# Patient Record
Sex: MALE | Race: WHITE | Employment: PART TIME | URBAN - METROPOLITAN AREA
[De-identification: names, ages, dates, MRNs, and addresses within clinical notes are randomized per-mention and may not be internally consistent; named-entity substitution may affect disease eponyms.]

---
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
## 2018-01-02 ENCOUNTER — ANESTHESIA EVENT (OUTPATIENT)
Dept: SURGERY | Facility: CLINIC | Age: 18
End: 2018-01-02
Payer: COMMERCIAL

## 2018-01-02 NOTE — H&P (VIEW-ONLY)
[facility]  [address] [place] [address]  [phone]  Dept: [phone]    PRE-OP EVALUATION:  Today's date: 2017    Sanjay Burton (: 2000) presents for pre-operative evaluation assessment as requested by Dr. Acosta.  He requires evaluation and anesthesia risk assessment prior to undergoing surgery/procedure for treatment of Pilonidal Cyst, .  Proposed procedure: excision pilonidal cyst    Date of Surgery/ Procedure: 17  Time of Surgery/ Procedure: 730  Hospital/Surgical Facility: [facility]  Fax number for surgical facility:   Primary Physician: Cari Lal  Type of Anesthesia Anticipated: General    Patient has a Health Care Directive or Living Will:  NO    Preop Questions 2017   1.  Do you have a history of heart attack, stroke, stent, bypass or surgery on an artery in the head, neck, heart or legs? No   2.  Do you ever have any pain or discomfort in your chest? No   3.  Do you have a history of  Heart Failure? No   4.   Are you troubled by shortness of breath when:  walking on a level surface, or up a slight hill, or at night? No   5.  Do you currently have a cold, bronchitis or other respiratory infection? No   6.  Do you have a cough, shortness of breath, or wheezing? No   7.  Do you sometimes get pains in the calves of your legs when you walk? No   8. Do you or anyone in your family have previous history of blood clots? No   9.  Do you or does anyone in your family have a serious bleeding problem such as prolonged bleeding following surgeries or cuts? No   10. Have you ever had problems with anemia or been told to take iron pills? No   11. Have you had any abnormal blood loss such as black, tarry or bloody stools? No   12. Have you ever had a blood transfusion? No   13. Have you or any of your relatives ever had problems with anesthesia? No   14. Do you have sleep apnea, excessive snoring or daytime drowsiness? No   15. Do you have  any prosthetic heart valves? No   16. Do you have prosthetic joints? No           HPI:                                                      Brief HPI related to upcoming procedure: pilonidal cyst in need of intervention      See problem list for active medical problems.  Problems all longstanding and stable, except as noted/documented.  See ROS for pertinent symptoms related to these conditions.                                                                                                  .    MEDICAL HISTORY:                                                      Patient Active Problem List    Diagnosis Date Noted     Pilonidal cyst without abscess 11/28/2017     Priority: Medium     Acne 08/10/2012     Priority: Medium     NO ACTIVE PROBLEMS 08/09/2012     Priority: Medium      No past medical history on file.  No past surgical history on file.  Current Outpatient Prescriptions   Medication Sig Dispense Refill     sulfamethoxazole-trimethoprim (BACTRIM DS/SEPTRA DS) 800-160 MG per tablet Take 1 tablet by mouth 2 times daily 20 tablet 0     OTC products: None, except as noted above    No Known Allergies   Latex Allergy: NO    Social History   Substance Use Topics     Smoking status: Passive Smoke Exposure - Never Smoker     Smokeless tobacco: Never Used      Comment: outside smoke     Alcohol use No     History   Drug Use No       REVIEW OF SYSTEMS:                                                    C: NEGATIVE for fever, chills, change in weight  I: NEGATIVE for worrisome rashes, moles or lesions  E: NEGATIVE for vision changes or irritation  E/M: NEGATIVE for ear, mouth and throat problems  R: NEGATIVE for significant cough or SOB  B: NEGATIVE for masses, tenderness or discharge  CV: NEGATIVE for chest pain, palpitations or peripheral edema  GI: NEGATIVE for nausea, abdominal pain, heartburn, or change in bowel habits  : NEGATIVE for frequency, dysuria, or hematuria  M: NEGATIVE for significant arthralgias or  myalgia  N: NEGATIVE for weakness, dizziness or paresthesias  E: NEGATIVE for temperature intolerance, skin/hair changes  H: NEGATIVE for bleeding problems  P: NEGATIVE for changes in mood or affect    EXAM:                                                    There were no vitals taken for this visit.    GENERAL APPEARANCE: healthy, alert and no distress     EYES: EOMI,  PERRL     HENT: ear canals and TM's normal and nose and mouth without ulcers or lesions     NECK: no adenopathy, no asymmetry, masses, or scars and thyroid normal to palpation     RESP: lungs clear to auscultation - no rales, rhonchi or wheezes     CV: regular rates and rhythm, normal S1 S2, no S3 or S4 and no murmur, click or rub     ABDOMEN:  soft, nontender, no HSM or masses and bowel sounds normal     MS: extremities normal- no gross deformities noted, no evidence of inflammation in joints, FROM in all extremities.     SKIN: no suspicious lesions or rashes     NEURO: Normal strength and tone, sensory exam grossly normal, mentation intact and speech normal     PSYCH: mentation appears normal. and affect normal/bright     LYMPHATICS: No axillary, cervical, or supraclavicular nodes    DIAGNOSTICS:                                                    No labs or EKG required for low risk surgery (cataract, skin procedure, breast biopsy, etc)    Recent Labs   Lab Test  02/21/13   1335  04/05/11   1203   HGB  15.3  12.9   PLT  271  231   NA   --   139   POTASSIUM   --   4.0   CR   --   0.53        IMPRESSION:                                                    Reason for surgery/procedure: pilonidal cyst intervention  Diagnosis/reason for consult: surgical clearance    The proposed surgical procedure is considered LOW risk.    REVISED CARDIAC RISK INDEX  The patient has the following serious cardiovascular risks for perioperative complications such as (MI, PE, VFib and 3  AV Block):  No serious cardiac risks  INTERPRETATION: 1 risks: Class II (low risk -  0.9% complication rate)    The patient has the following additional risks for perioperative complications:  No identified additional risks  The ASCVD Risk score (Sky DC Jr, et al., 2013) failed to calculate for the following reasons:    The 2013 ASCVD risk score is only valid for ages 40 to 79      ICD-10-CM    1. Preop general physical exam Z01.818    2. Pilonidal cyst without abscess L05.91        RECOMMENDATIONS:                                                      --Patient is to take all scheduled medications on the day of surgery EXCEPT for modifications listed below.    Approval given to proceed with proposed procedure, without further diagnostic evaluation.  Patient was well the day of the exam.  Given that his surgery is about 2 weeks away and the time of the year, he may very well develop an URI prior to surgery.  Careful reexamination prior to anesthesia is encouraged.    Signed Electronically by: LINDA Anderson PA-C    Copy of this evaluation report is provided to requesting physician.    [facility] Preop Guidelines  Answers for HPI/ROS submitted by the patient on 12/19/2017   If you checked off any problems, how difficult have these problems made it for you to do your work, take care of things at home, or get along with other people?: Not difficult at all  PHQ9 TOTAL SCORE: 0  ALECIA 7 TOTAL SCORE: 0

## 2018-01-03 ENCOUNTER — ANESTHESIA (OUTPATIENT)
Dept: SURGERY | Facility: CLINIC | Age: 18
End: 2018-01-03
Payer: COMMERCIAL

## 2018-01-03 ENCOUNTER — HOSPITAL ENCOUNTER (OUTPATIENT)
Facility: CLINIC | Age: 18
Discharge: HOME | End: 2018-01-03
Attending: SPECIALIST | Admitting: SPECIALIST
Payer: COMMERCIAL

## 2018-01-03 VITALS
SYSTOLIC BLOOD PRESSURE: 144 MMHG | HEIGHT: 68 IN | RESPIRATION RATE: 16 BRPM | BODY MASS INDEX: 27.65 KG/M2 | DIASTOLIC BLOOD PRESSURE: 90 MMHG | WEIGHT: 182.4 LBS | OXYGEN SATURATION: 99 % | HEART RATE: 75 BPM | TEMPERATURE: 97.5 F

## 2018-01-03 DIAGNOSIS — G89.18 POST-OP PAIN: Primary | ICD-10-CM

## 2018-01-03 PROCEDURE — 11771 EXC PILONIDAL CYST XTNSV: CPT | Performed by: SPECIALIST

## 2018-01-03 PROCEDURE — 37000008 ZZH ANESTHESIA TECHNICAL FEE, 1ST 30 MIN: Performed by: SPECIALIST

## 2018-01-03 PROCEDURE — 88304 TISSUE EXAM BY PATHOLOGIST: CPT | Mod: 26 | Performed by: SPECIALIST

## 2018-01-03 PROCEDURE — 71000027 ZZH RECOVERY PHASE 2 EACH 15 MINS: Performed by: SPECIALIST

## 2018-01-03 PROCEDURE — 37000009 ZZH ANESTHESIA TECHNICAL FEE, EACH ADDTL 15 MIN: Performed by: SPECIALIST

## 2018-01-03 PROCEDURE — 25000566 ZZH SEVOFLURANE, EA 15 MIN: Performed by: SPECIALIST

## 2018-01-03 PROCEDURE — 25000128 H RX IP 250 OP 636: Performed by: NURSE ANESTHETIST, CERTIFIED REGISTERED

## 2018-01-03 PROCEDURE — 40000306 ZZH STATISTIC PRE PROC ASSESS II: Performed by: SPECIALIST

## 2018-01-03 PROCEDURE — 71000014 ZZH RECOVERY PHASE 1 LEVEL 2 FIRST HR: Performed by: SPECIALIST

## 2018-01-03 PROCEDURE — 25000125 ZZHC RX 250: Performed by: SPECIALIST

## 2018-01-03 PROCEDURE — 25000128 H RX IP 250 OP 636: Performed by: SPECIALIST

## 2018-01-03 PROCEDURE — 25000132 ZZH RX MED GY IP 250 OP 250 PS 637: Performed by: NURSE ANESTHETIST, CERTIFIED REGISTERED

## 2018-01-03 PROCEDURE — 27210794 ZZH OR GENERAL SUPPLY STERILE: Performed by: SPECIALIST

## 2018-01-03 PROCEDURE — 88304 TISSUE EXAM BY PATHOLOGIST: CPT | Performed by: SPECIALIST

## 2018-01-03 PROCEDURE — 36000050 ZZH SURGERY LEVEL 2 1ST 30 MIN: Performed by: SPECIALIST

## 2018-01-03 PROCEDURE — C9399 UNCLASSIFIED DRUGS OR BIOLOG: HCPCS | Performed by: NURSE ANESTHETIST, CERTIFIED REGISTERED

## 2018-01-03 PROCEDURE — 25000125 ZZHC RX 250: Performed by: NURSE ANESTHETIST, CERTIFIED REGISTERED

## 2018-01-03 PROCEDURE — 36000052 ZZH SURGERY LEVEL 2 EA 15 ADDTL MIN: Performed by: SPECIALIST

## 2018-01-03 RX ORDER — HYDROMORPHONE HYDROCHLORIDE 1 MG/ML
.3-.5 INJECTION, SOLUTION INTRAMUSCULAR; INTRAVENOUS; SUBCUTANEOUS EVERY 10 MIN PRN
Status: DISCONTINUED | OUTPATIENT
Start: 2018-01-03 | End: 2018-01-03 | Stop reason: HOSPADM

## 2018-01-03 RX ORDER — SODIUM CHLORIDE, SODIUM LACTATE, POTASSIUM CHLORIDE, CALCIUM CHLORIDE 600; 310; 30; 20 MG/100ML; MG/100ML; MG/100ML; MG/100ML
INJECTION, SOLUTION INTRAVENOUS CONTINUOUS
Status: DISCONTINUED | OUTPATIENT
Start: 2018-01-03 | End: 2018-01-03 | Stop reason: HOSPADM

## 2018-01-03 RX ORDER — DEXAMETHASONE SODIUM PHOSPHATE 10 MG/ML
INJECTION, SOLUTION INTRAMUSCULAR; INTRAVENOUS PRN
Status: DISCONTINUED | OUTPATIENT
Start: 2018-01-03 | End: 2018-01-03

## 2018-01-03 RX ORDER — LIDOCAINE HYDROCHLORIDE 20 MG/ML
INJECTION, SOLUTION INFILTRATION; PERINEURAL PRN
Status: DISCONTINUED | OUTPATIENT
Start: 2018-01-03 | End: 2018-01-03

## 2018-01-03 RX ORDER — ONDANSETRON 2 MG/ML
INJECTION INTRAMUSCULAR; INTRAVENOUS PRN
Status: DISCONTINUED | OUTPATIENT
Start: 2018-01-03 | End: 2018-01-03

## 2018-01-03 RX ORDER — ONDANSETRON 2 MG/ML
4 INJECTION INTRAMUSCULAR; INTRAVENOUS EVERY 30 MIN PRN
Status: DISCONTINUED | OUTPATIENT
Start: 2018-01-03 | End: 2018-01-03 | Stop reason: HOSPADM

## 2018-01-03 RX ORDER — ALBUTEROL SULFATE 0.83 MG/ML
2.5 SOLUTION RESPIRATORY (INHALATION) EVERY 4 HOURS PRN
Status: DISCONTINUED | OUTPATIENT
Start: 2018-01-03 | End: 2018-01-03 | Stop reason: HOSPADM

## 2018-01-03 RX ORDER — FENTANYL CITRATE 50 UG/ML
25-50 INJECTION, SOLUTION INTRAMUSCULAR; INTRAVENOUS
Status: DISCONTINUED | OUTPATIENT
Start: 2018-01-03 | End: 2018-01-03 | Stop reason: HOSPADM

## 2018-01-03 RX ORDER — NALOXONE HYDROCHLORIDE 0.4 MG/ML
.1-.4 INJECTION, SOLUTION INTRAMUSCULAR; INTRAVENOUS; SUBCUTANEOUS
Status: DISCONTINUED | OUTPATIENT
Start: 2018-01-03 | End: 2018-01-03 | Stop reason: HOSPADM

## 2018-01-03 RX ORDER — DIMENHYDRINATE 50 MG/ML
12.5 INJECTION, SOLUTION INTRAMUSCULAR; INTRAVENOUS EVERY 10 MIN PRN
Status: DISCONTINUED | OUTPATIENT
Start: 2018-01-03 | End: 2018-01-03 | Stop reason: HOSPADM

## 2018-01-03 RX ORDER — CEFAZOLIN SODIUM 1 G/3ML
1 INJECTION, POWDER, FOR SOLUTION INTRAMUSCULAR; INTRAVENOUS SEE ADMIN INSTRUCTIONS
Status: DISCONTINUED | OUTPATIENT
Start: 2018-01-03 | End: 2018-01-03 | Stop reason: HOSPADM

## 2018-01-03 RX ORDER — BUPIVACAINE HYDROCHLORIDE AND EPINEPHRINE 2.5; 5 MG/ML; UG/ML
INJECTION, SOLUTION INFILTRATION; PERINEURAL PRN
Status: DISCONTINUED | OUTPATIENT
Start: 2018-01-03 | End: 2018-01-03 | Stop reason: HOSPADM

## 2018-01-03 RX ORDER — LIDOCAINE 40 MG/G
CREAM TOPICAL
Status: DISCONTINUED | OUTPATIENT
Start: 2018-01-03 | End: 2018-01-03 | Stop reason: HOSPADM

## 2018-01-03 RX ORDER — PROPOFOL 10 MG/ML
INJECTION, EMULSION INTRAVENOUS PRN
Status: DISCONTINUED | OUTPATIENT
Start: 2018-01-03 | End: 2018-01-03

## 2018-01-03 RX ORDER — OXYCODONE AND ACETAMINOPHEN 5; 325 MG/1; MG/1
2 TABLET ORAL
Status: DISCONTINUED | OUTPATIENT
Start: 2018-01-03 | End: 2018-01-03 | Stop reason: HOSPADM

## 2018-01-03 RX ORDER — OXYCODONE HYDROCHLORIDE 5 MG/1
10 TABLET ORAL EVERY 4 HOURS PRN
Status: DISCONTINUED | OUTPATIENT
Start: 2018-01-03 | End: 2018-01-03 | Stop reason: HOSPADM

## 2018-01-03 RX ORDER — ACETAMINOPHEN 10 MG/ML
INJECTION, SOLUTION INTRAVENOUS PRN
Status: DISCONTINUED | OUTPATIENT
Start: 2018-01-03 | End: 2018-01-03

## 2018-01-03 RX ORDER — MEPERIDINE HYDROCHLORIDE 25 MG/ML
12.5 INJECTION INTRAMUSCULAR; INTRAVENOUS; SUBCUTANEOUS
Status: DISCONTINUED | OUTPATIENT
Start: 2018-01-03 | End: 2018-01-03 | Stop reason: HOSPADM

## 2018-01-03 RX ORDER — CEFAZOLIN SODIUM 2 G/100ML
2 INJECTION, SOLUTION INTRAVENOUS
Status: COMPLETED | OUTPATIENT
Start: 2018-01-03 | End: 2018-01-03

## 2018-01-03 RX ORDER — OXYCODONE AND ACETAMINOPHEN 5; 325 MG/1; MG/1
1-2 TABLET ORAL EVERY 4 HOURS PRN
Qty: 30 TABLET | Refills: 0 | Status: SHIPPED | OUTPATIENT
Start: 2018-01-03 | End: 2018-01-06

## 2018-01-03 RX ORDER — FENTANYL CITRATE 50 UG/ML
INJECTION, SOLUTION INTRAMUSCULAR; INTRAVENOUS PRN
Status: DISCONTINUED | OUTPATIENT
Start: 2018-01-03 | End: 2018-01-03

## 2018-01-03 RX ORDER — ONDANSETRON 4 MG/1
4 TABLET, ORALLY DISINTEGRATING ORAL EVERY 30 MIN PRN
Status: DISCONTINUED | OUTPATIENT
Start: 2018-01-03 | End: 2018-01-03 | Stop reason: HOSPADM

## 2018-01-03 RX ADMIN — ONDANSETRON 4 MG: 2 INJECTION INTRAMUSCULAR; INTRAVENOUS at 08:14

## 2018-01-03 RX ADMIN — FENTANYL CITRATE 50 MCG: 50 INJECTION, SOLUTION INTRAMUSCULAR; INTRAVENOUS at 08:02

## 2018-01-03 RX ADMIN — ACETAMINOPHEN 1000 MG: 10 INJECTION, SOLUTION INTRAVENOUS at 07:57

## 2018-01-03 RX ADMIN — MIDAZOLAM 2 MG: 1 INJECTION INTRAMUSCULAR; INTRAVENOUS at 07:35

## 2018-01-03 RX ADMIN — DEXAMETHASONE SODIUM PHOSPHATE 10 MG: 10 INJECTION, SOLUTION INTRAMUSCULAR; INTRAVENOUS at 08:05

## 2018-01-03 RX ADMIN — SUGAMMADEX 200 MG: 100 INJECTION, SOLUTION INTRAVENOUS at 08:21

## 2018-01-03 RX ADMIN — LIDOCAINE HYDROCHLORIDE 40 MG: 20 INJECTION, SOLUTION INFILTRATION; PERINEURAL at 07:40

## 2018-01-03 RX ADMIN — OXYCODONE HYDROCHLORIDE 10 MG: 5 TABLET ORAL at 09:34

## 2018-01-03 RX ADMIN — FENTANYL CITRATE 100 MCG: 50 INJECTION, SOLUTION INTRAMUSCULAR; INTRAVENOUS at 07:39

## 2018-01-03 RX ADMIN — CEFAZOLIN SODIUM 2 G: 2 INJECTION, SOLUTION INTRAVENOUS at 07:56

## 2018-01-03 RX ADMIN — ROCURONIUM BROMIDE 50 MG: 10 INJECTION INTRAVENOUS at 07:40

## 2018-01-03 RX ADMIN — PROPOFOL 200 MG: 10 INJECTION, EMULSION INTRAVENOUS at 07:40

## 2018-01-03 RX ADMIN — FENTANYL CITRATE 50 MCG: 50 INJECTION, SOLUTION INTRAMUSCULAR; INTRAVENOUS at 08:04

## 2018-01-03 RX ADMIN — FENTANYL CITRATE 50 MCG: 50 INJECTION, SOLUTION INTRAMUSCULAR; INTRAVENOUS at 08:06

## 2018-01-03 RX ADMIN — LIDOCAINE HYDROCHLORIDE 1 ML: 10 INJECTION, SOLUTION EPIDURAL; INFILTRATION; INTRACAUDAL; PERINEURAL at 06:58

## 2018-01-03 RX ADMIN — SODIUM CHLORIDE, POTASSIUM CHLORIDE, SODIUM LACTATE AND CALCIUM CHLORIDE: 600; 310; 30; 20 INJECTION, SOLUTION INTRAVENOUS at 06:58

## 2018-01-03 NOTE — OP NOTE
PREOPERATIVE DIAGNOSIS:  Infected pilonidal cyst.      POSTOPERATIVE DIAGNOSIS:  Infected pilonidal cyst.      PROCEDURE:  Excision of infected pilonidal cyst.      SURGEON:  Enoch Acosta MD, FACS      ANESTHESIA:  General via endotracheal tube.      INDICATIONS FOR PROCEDURE:  The patient Sanjay Burton is a 17-year-old male with a long-standing history of intermittently draining pilonidal disease.  He has finally elected to have it treated.  On examination there are extensive crypts which contain large chrissie of hair and chronic granulation tissue with multiple draining sinuses in the superior aspect.      OPERATIVE FINDINGS:     1.  Extensive pilonidal disease.   2.  Multiple draining sinuses.      DESCRIPTION OF PROCEDURE:  The patient was taken to the operating room on 01/03/2018, and after induction of general anesthesia on the cart he was placed in the prone position on the operating room table.  The buttock cheeks were taped apart, and the lower back was prepped and draped in a sterile fashion.  A time-out was observed confirming the identity of the patient and the procedure to be performed.        A 15 blade was used to make an elliptical incision encompassing all the pilonidal disease.  Subcutaneous tissue was dissected using cautery.  We dissected down to the presacral fascia and dissected the large pilonidal cyst off the presacral fascia using cautery.  The cyst was submitted as a specimen.      Hemostasis was achieved using cautery.  The area was infiltrated with a local anesthetic.  The tapes were removed.  A single #1 nylon suture was used to roughly reapproximate the defect, and then both sites were packed with moist Kerlix.  Sterile dressings were applied, and the patient was taken from the operating room to the Recovery Room in stable condition.      PLAN:  The patient will be sent home from the Recovery Room.         ENOCH ACOSTA MD, FACS             D: 01/03/2018 08:29   T: 01/03/2018  11:27   MT: [id number]      Name:     POPEYE QURESHI   MRN:      [id number]        Account:        [id number]   :      2000           Procedure Date: 2018      Document: [id number]       cc: Cari Lal MD

## 2018-01-03 NOTE — OR NURSING
S: Education on wet to dry dressing with patient and his mother Berna  B: S/P Pilondial cystectomy with Dr. Acosta  A: Used dressing supplies as a prop to discuss how to do dressing change.  Reviewed taking dressing off in shower, clean wound in shower.  Mother agreed to help patient with dressing application 2 x a day.  R: Patient and his mother verbalized understanding of how to do dressing change and will f/u with Dr. Acosta on Jan. 8, 2018 as scheduled.

## 2018-01-03 NOTE — DISCHARGE INSTRUCTIONS
[facility]   Adult Discharge Orders & Instructions     For 24 hours after surgery    1. Get plenty of rest.  A responsible adult must stay with you for at least 24 hours after you leave the hospital.   2. Do not drive or use heavy equipment.  If you have weakness or tingling, don't drive or use heavy equipment until this feeling goes away.  3. Do not drink alcohol.  4. Avoid strenuous or risky activities.  Ask for help when climbing stairs.   5. You may feel lightheaded.  IF so, sit for a few minutes before standing.  Have someone help you get up.   6. If you have nausea (feel sick to your stomach): Drink only clear liquids such as apple juice, ginger ale, broth or 7-Up.  Rest may also help.  Be sure to drink enough fluids.  Move to a regular diet as you feel able.  7. You may have a slight fever. Call the doctor if your fever is over 100 F (37.7 C) (taken under the tongue) or lasts longer than 24 hours.  8. You may have a dry mouth, a sore throat, muscle aches or trouble sleeping.  These should go away after 24 hours.  9. Do not make important or legal decisions.   Call your doctor for any of the followin.  Signs of infection (fever, growing tenderness at the surgery site, a large amount of drainage or bleeding, severe pain, foul-smelling drainage, redness, swelling).    2. It has been over 8 to 10 hours since surgery and you are still not able to urinate (pass water).    To contact a doctor, call [phone] during clinic hours, after hours call [facility] at [phone]

## 2018-01-03 NOTE — ANESTHESIA POSTPROCEDURE EVALUATION
Patient: Sanjay Burton    Procedure(s):  Excision Pilonidal Cyst - Wound Class: II-Clean Contaminated    Diagnosis:infected pilonidal cyst  Diagnosis Additional Information: No value filed.    Anesthesia Type:  General, ETT    Note:  Anesthesia Post Evaluation    Patient location during evaluation: Phase 2 and Bedside  Patient participation: Able to fully participate in evaluation  Level of consciousness: awake and alert  Pain management: satisfactory to patient  Airway patency: patent  Cardiovascular status: stable  Respiratory status: room air and spontaneous ventilation  Hydration status: stable  PONV: none     Anesthetic complications: None    Comments: Appear to tolerate Gen well without anesthesia related problems / complications noted.  Pain level adequate per patient. No N  /  V .  No complaints per patient.  Will follow as needed.        Last vitals:  Vitals:    01/03/18 0850 01/03/18 0855 01/03/18 0900   BP: 106/58  115/58   Pulse:      Resp: 13 15 (!) 7   Temp:      SpO2: 100% 100% 100%         Electronically Signed By: MIGUELINA Camarena CRNA  January 3, 2018  9:30 AM

## 2018-01-03 NOTE — IP AVS SNAPSHOT
[facility] Phase II    [address]     [place] [address]    Phone:  [phone]                                       After Visit Summary   1/3/2018    Sanjay Burton    MRN: [id number]           After Visit Summary Signature Page     I have received my discharge instructions, and my questions have been answered. I have discussed any challenges I see with this plan with the nurse or doctor.    ..........................................................................................................................................  Patient/Patient Representative Signature      ..........................................................................................................................................  Patient Representative Print Name and Relationship to Patient    ..................................................               ................................................  Date                                            Time    ..........................................................................................................................................  Reviewed by Signature/Title    ...................................................              ..............................................  Date                                                            Time

## 2018-01-03 NOTE — ANESTHESIA PREPROCEDURE EVALUATION
Anesthesia Evaluation     . Pt has not had prior anesthetic            ROS/MED HX    ENT/Pulmonary:  - neg pulmonary ROS     Neurologic:  - neg neurologic ROS     Cardiovascular:  - neg cardiovascular ROS       METS/Exercise Tolerance:  >4 METS   Hematologic:  - neg hematologic  ROS   (+) pt is not anticoagulated, -      Musculoskeletal:  - neg musculoskeletal ROS (+) , , other musculoskeletal- Infected Pilonidal cyst- presents for surgical intervention      GI/Hepatic:  - neg GI/hepatic ROS       Renal/Genitourinary:  - ROS Renal section negative   (+) Pt has no history of transplant,       Endo:  - neg endo ROS       Psychiatric:  - neg psychiatric ROS       Infectious Disease:  - neg infectious disease ROS       Malignancy:      - no malignancy   Other:    (+) No chance of pregnancy C-spine cleared: N/A, no H/O Chronic Pain,no other significant disability   - neg other ROS                 Physical Exam  Normal systems: cardiovascular, pulmonary and dental    Airway   Mallampati: II  TM distance: >3 FB  Neck ROM: full    Dental     Cardiovascular   Rhythm and rate: regular and normal  (-) no murmur    Pulmonary    breath sounds clear to auscultation                    Anesthesia Plan      History & Physical Review  History and physical reviewed and following examination; no interval change.    ASA Status:  1 .    NPO Status:  > 6 hours    Plan for General and ETT with Intravenous and Propofol induction. Maintenance will be TIVA.    PONV prophylaxis:  Ondansetron (or other 5HT-3) and Dexamethasone or Solumedrol       Postoperative Care  Postoperative pain management:  IV analgesics and Oral pain medications.      Consents  Anesthetic plan, risks, benefits and alternatives discussed with:  Patient.  Use of blood products discussed: No .   .                          .

## 2018-01-03 NOTE — BRIEF OP NOTE
[facility] Brief Operative Note    Pre-operative diagnosis: infected pilonidal cyst   Post-operative diagnosis Same   Procedure: Procedure(s):  Excision Pilonidal Cyst - Wound Class: II-Clean Contaminated   Surgeon: Enoch Acosta MD,FACS   Assistants(s): None   Estimated blood loss: Less than 50 ml    Specimens: Pilonidal cyst   Findings: Extensive pilonidal dz with multiple draining sinuses.     Enoch Acosta MD, FACS    #[id number]

## 2018-01-03 NOTE — ANESTHESIA CARE TRANSFER NOTE
Patient: Sanjay Burton    Procedure(s):  Excision Pilonidal Cyst - Wound Class: II-Clean Contaminated    Diagnosis: infected pilonidal cyst  Diagnosis Additional Information: No value filed.    Anesthesia Type:   General, ETT     Note:  Airway :Nasal Cannula and Oral Airway  Patient transferred to:PACU  Handoff Report: Identifed the Patient, Identified the Reponsible Provider, Reviewed the pertinent medical history, Discussed the surgical course, Reviewed Intra-OP anesthesia mangement and issues during anesthesia, Set expectations for post-procedure period and Allowed opportunity for questions and acknowledgement of understanding      Vitals: (Last set prior to Anesthesia Care Transfer)    CRNA VITALS  1/3/2018 0804 - 1/3/2018 0844      1/3/2018             Pulse: 63    SpO2: 100 %                Electronically Signed By: MIGUELINA Camarena CRNA  January 3, 2018  8:44 AM

## 2018-01-03 NOTE — IP AVS SNAPSHOT
MRN:[id number]                      After Visit Summary   1/3/2018    Sanjay Burton    MRN: [id number]           Thank you!     Thank you for choosing [facility] for your care. Our goal is always to provide you with excellent care. Hearing back from our patients is one way we can continue to improve our services. Please take a few minutes to complete the written survey that you may receive in the mail after you visit with us. Thank you!        Patient Information     Date Of Birth          2000        About your hospital stay     You were admitted on:  January 3, 2018 You last received care in the:  [facility] Phase II    You were discharged on:  January 3, 2018       Who to Call     For medical emergencies, please call 911.  For non-urgent questions about your medical care, please call your primary care provider or clinic, [phone]  For questions related to your surgery, please call your surgery clinic        Attending Provider     Provider Specialty    Enoch Acosta MD General Surgery       Primary Care Provider Office Phone # Fax #    Cari Lal [phone]      After Care Instructions     Diet Instructions       Resume pre-procedure diet            Discharge Instructions       Follow up appointment as instructed by Surgeon and or RN            Dressing       Keep dressing clean and dry.  Dressing / incisional care as instructed by RN and or Surgeon            Ice to affected area       Ice to operative site PRN            No Alcohol       For 24 hours post procedure            No driving or operating machinery        until the day after procedure            No lifting        No lifting over 20 lbs and no strenuous physical activity for 2 weeks            Shower       No shower for 24 hours post procedure. May shower Postoperative Day (POD) 1, remove packing in shower in am.  Wash would BID with soap and water then pack lightly with moist gauze                   Your next 10 appointments already scheduled     2018  9:00 AM CST   [facility] with E[facility] Acosta MD   [facility]    [address]  [place] [address] [phone]              Further instructions from your care team       [facility]   Adult Discharge Orders & Instructions     For 24 hours after surgery    1. Get plenty of rest.  A responsible adult must stay with you for at least 24 hours after you leave the hospital.   2. Do not drive or use heavy equipment.  If you have weakness or tingling, don't drive or use heavy equipment until this feeling goes away.  3. Do not drink alcohol.  4. Avoid strenuous or risky activities.  Ask for help when climbing stairs.   5. You may feel lightheaded.  IF so, sit for a few minutes before standing.  Have someone help you get up.   6. If you have nausea (feel sick to your stomach): Drink only clear liquids such as apple juice, ginger ale, broth or 7-Up.  Rest may also help.  Be sure to drink enough fluids.  Move to a regular diet as you feel able.  7. You may have a slight fever. Call the doctor if your fever is over 100 F (37.7 C) (taken under the tongue) or lasts longer than 24 hours.  8. You may have a dry mouth, a sore throat, muscle aches or trouble sleeping.  These should go away after 24 hours.  9. Do not make important or legal decisions.   Call your doctor for any of the followin.  Signs of infection (fever, growing tenderness at the surgery site, a large amount of drainage or bleeding, severe pain, foul-smelling drainage, redness, swelling).    2. It has been over 8 to 10 hours since surgery and you are still not able to urinate (pass water).    To contact a doctor, call [phone] during clinic hours, after hours call [facility] at [phone]    Pending Results     Date and Time Order Name Status Description    1/3/2018 0819 Surgical pathology exam  "In process             Admission Information     Date & Time Provider Department Dept. Phone    1/3/2018 Enoch Acosta MD [facility] Phase [phone]      Your Vitals Were     Blood Pressure Pulse Temperature Respirations Height Weight    115/58 75 97.5  F (36.4  C) (Axillary) 7 1.733 m (5' 8.23\") 82.7 kg (182 lb 6.4 oz)    Pulse Oximetry BMI (Body Mass Index)                100% 27.55 kg/m2          [email or web address] Information     [employer] lets you send messages to your doctor, view your test results, renew your prescriptions, schedule appointments and more. To sign up, go to [email or web address] . Click on \"Log in\" on the left side of the screen, which will take you to the Welcome page. Then click on \"Sign up Now\" on the right side of the page.     You will be asked to enter the access code listed below, as well as some personal information. Please follow the directions to create your username and password.     Your access code is: [email or web address]  Expires: 2018  3:30 PM     Your access code will  in 90 days. If you need help or a new code, please call your [facility] clinic or [phone].        Care EveryWhere ID     This is your Care EveryWhere ID. This could be used by other organizations to access your [facility] medical records  [id number]        Equal Access to Services     [facility] BRIANNA : Hadii ulises napoles Sobrianna, waaxda luqadaha, qaybta kaalmada adeegyada, mayte finley . So [facility] [phone].    ATENCIÓN: Si habla español, tiene a anne disposición servicios gratuitos de asistencia lingüística. Llame al [phone].    We comply with applicable federal civil rights laws and [place] laws. We do not discriminate on the basis of race, color, national origin, age, disability, sex, sexual orientation, or gender identity.               Review of your medicines      START taking        Dose / Directions    oxyCODONE-acetaminophen 5-325 MG per tablet   Commonly " known as:  PERCOCET   Used for:  Post-op pain        Dose:  1-2 tablet   Take 1-2 tablets by mouth every 4 hours as needed for pain (moderate to severe)   Quantity:  30 tablet   Refills:  0            Where to get your medicines      Some of these will need a paper prescription and others can be bought over the counter. Ask your nurse if you have questions.     Bring a paper prescription for each of these medications     oxyCODONE-acetaminophen 5-325 MG per tablet                Protect others around you: Learn how to safely use, store and throw away your medicines at www.disposemymeds.org.             Medication List: This is a list of all your medications and when to take them. Check marks below indicate your daily home schedule. Keep this list as a reference.      Medications           Morning Afternoon Evening Bedtime As Needed    oxyCODONE-acetaminophen 5-325 MG per tablet   Commonly known as:  PERCOCET   Take 1-2 tablets by mouth every 4 hours as needed for pain (moderate to severe)                                          More Information        Wound Care After Packing Removal or Replacement  Packing is a special type of dressing placed inside a wound to help it heal. Once the packing is removed, you need to care for your wound. Good wound care helps prevent infection. Be sure to keep all follow-up appointments with your healthcare provider. Follow these instructions to take care of the wound once you re at home.  Home care  Your healthcare provider may prescribe medicines for pain. Or he or she may suggest an over-the-counter (OTC) pain reliever, such as ibuprofen or acetaminophen. Talk with your healthcare provider before taking any OTC medicines if you have chronic liver or kidney disease, a stomach ulcer, or gastrointestinal bleeding. In certain cases, you may also need to take antibiotics to help prevent infection. If so, take them exactly as directed for as long as directed. Don t stop taking your  antibiotics until they are all gone, even if you feel better.  Here are some general care guidelines for your wound:    Follow your healthcare provider s instructions on how to care for your wound. Always wash your hands with soap and warm water before and after tending to your wound.    If a bandage was put on, remove and change it once a day or as directed. If the bandage gets wet or dirty, replace it as soon as possible with a new bandage. Use a clean cloth to gently pat the wound dry.    If your packing was replaced, a small piece of gauze may hang from the wound. It allows fluid to continue draining from the wound. You may need to use an ointment or cream to keep the packing from sticking to the bandage.    Don't bathe in a tub or soak your wound until your healthcare provider says it s OK. Take showers or sponge baths instead. Avoid swimming.    Don t scratch, rub, scrub, or pick your wound.    Check your wound daily for the signs of infection listed below.  If your wound was closed, it was likely with one of four types of closures. These include stitches (sutures), strips of surgical tape, skin glue, and staples. Your healthcare provider will decide on the best closure based on the size and location of your wound. Each type of closure needs specific care.    Sutures. You may want to clean the wound daily after the first 2 to 3 days. To do this, remove the bandage and gently wash the area with soap and warm water. After cleaning, apply a thin layer of antibiotic ointment if recommended. Then put on a new bandage. Sutures on the outside of the skin usually need to be removed by your healthcare provider.    Surgical tape. Keep the area dry. If it gets wet, blot it dry with a towel. Surgical tape closures usually fall off within 7 to 10 days. If they have not fallen off after 10 days, you can remove them yourself. To remove the tape, use mineral oil or petroleum jelly on a cotton ball to gently rub the  adhesive.    Skin glue. You may shower or bathe as usual. But do not use soaps, lotions, or ointments on the wound area. Do not scrub the wound. After bathing, pat the wound dry with a soft towel. Do not apply liquids like peroxide, ointments, or creams to the wound while the strips or film is in place. Don't scratch, rub, or pick at the strips or film. Don't put tape directly over the strips or film. Skin adhesive film will fall off naturally in 5 to 10 days. If it does not peel off in 10 days, gently rub petroleum jelly or an ointment onto the film.    [place]. Take showers or sponge baths. Don't take tub baths. Don't use lotions on the wound area. The area may be cleaned with soap and water 2 to 3 days after the wound was stapled. Don't scrub the wound. Pat it dry with a clean soft cloth or towel. You can use antibiotic ointment if your healthcare provider tells you to. Staples will need to be removed in 10 to 14 days.  Follow-up care  Follow up with your healthcare provider, or as directed. If your packing was replaced, you may need another visit within 1 to 3 days to remove or replace it. If you have sutures or staples, return for their removal as directed.  When to seek medical advice  Call your health care provider right away if you have signs of infection:    Fever of 1 degree or more above your normal temperature, or as directed by your healthcare provider    Increasing pain in the wound or pain that doesn t get better even with pain medicine    Increasing redness or swelling    Pus or bad-smelling drainage from the wound  Also call your healthcare provider right away if any of these occur:    Your wound bleeds more than a small amount or won t stop bleeding.    The edges of your wound come apart.    You have numbness or weakness in the wound area that doesn t go away.  Date Last Reviewed: 10/2/2015    9622-3486 [employer]. [address] [place] [address]. All rights reserved. This  information is not intended as a substitute for professional medical care. Always follow your healthcare professional's instructions.                Step-by-Step:  Changing a Wound Dressing    Date Last Reviewed: 10/1/2016    2662-3568 [employer]. [address] [place] [address], [place] [address]. All rights reserved. This information is not intended as a substitute for professional medical care. Always follow your healthcare professional's instructions.

## 2018-01-04 ENCOUNTER — OFFICE VISIT (OUTPATIENT)
Dept: SURGERY | Facility: CLINIC | Age: 18
End: 2018-01-04
Payer: COMMERCIAL

## 2018-01-04 VITALS — OXYGEN SATURATION: 100 % | BODY MASS INDEX: 27.4 KG/M2 | HEART RATE: 110 BPM | WEIGHT: 181.4 LBS

## 2018-01-04 DIAGNOSIS — T14.8XXA BLEEDING FROM WOUND: Primary | ICD-10-CM

## 2018-01-04 PROCEDURE — 99024 POSTOP FOLLOW-UP VISIT: CPT | Performed by: SPECIALIST

## 2018-01-04 NOTE — LETTER
1/4/2018         RE: Sanjay Burton  [address] [facility] [address]        Dear Colleague,    Thank you for referring your patient, Sanjay Burton, to the [facility]. Please see a copy of my visit note below.    F/U for pilonidal cystecytomy      Subjective:  Pt had pilonidal cystectomy yesterday.  Wound left open.  Mom removed packing this morning and noticed some pulsatile bleeding - became concerned.  Now presents for follow up.      Objective:  B/P: Data Unavailable, T: Data Unavailable, P: 110, R: Data Unavailable  Back: Wound clean.  Small bleeder inf aspect of wound - cauterized.    Wound packed for 5 min and re-inspected - no further bleeding.      Assessment/plan:  Pt s/p pilonidal cystectomy who returns with a bleeding wound.  Controlled with cautery.  Will cont BID washing and packing with moist gauze.  Knows to return if bleeding issues.  F/U as scheduled.    Enoch Acosta MD, FACS    Again, thank you for allowing me to participate in the care of your patient.        Sincerely,        Enoch Acosta MD

## 2018-01-04 NOTE — PROGRESS NOTES
F/U for pilonidal cystecytomy      Subjective:  Pt had pilonidal cystectomy yesterday.  Wound left open.  Mom removed packing this morning and noticed some pulsatile bleeding - became concerned.  Now presents for follow up.      Objective:  B/P: Data Unavailable, T: Data Unavailable, P: 110, R: Data Unavailable  Back: Wound clean.  Small bleeder inf aspect of wound - cauterized.    Wound packed for 5 min and re-inspected - no further bleeding.      Assessment/plan:  Pt s/p pilonidal cystectomy who returns with a bleeding wound.  Controlled with cautery.  Will cont BID washing and packing with moist gauze.  Knows to return if bleeding issues.  F/U as scheduled.    Enoch Acosta MD, FACS

## 2018-01-04 NOTE — PROVIDER NOTIFICATION
Safe Accounts (purposefully retained items)     We care about the coordination of your care  1. Do you still have packing in place? yes  2. If the item is in place, Can you review the plan for removal with me? It was removed today by surgeon and re-packed. Instructed by surgeon on removal.

## 2018-01-04 NOTE — NURSING NOTE
"Chief Complaint   Patient presents with     Surgical Followup     having some bleeding       Initial Pulse 110  Wt 82.3 kg (181 lb 6.4 oz)  SpO2 100%  BMI 27.4 kg/m2 Estimated body mass index is 27.4 kg/(m^2) as calculated from the following:    Height as of 1/3/18: 1.733 m (5' 8.23\").    Weight as of this encounter: 82.3 kg (181 lb 6.4 oz).  Medication Reconciliation: complete    "

## 2018-01-04 NOTE — MR AVS SNAPSHOT
"              After Visit Summary   1/4/2018    Sanjay Burton    MRN: [id number]           Patient Information     Date Of Birth          2000        Visit Information        Provider Department      1/4/2018 9:30 AM Enoch Acosta MD [facility]        Today's Diagnoses     Bleeding from wound    -  1       Follow-ups after your visit        Your next 10 appointments already scheduled     Jan 08, 2018  9:00 AM CST   [facility]    [address] [address] [phone]              Who to contact     If you have questions or need follow up information about today's clinic visit or your schedule please contact [facility] directly at [phone].  Normal or non-critical lab and imaging results will be communicated to you by MyChart, letter or phone within 4 business days after the clinic has received the results. If you do not hear from us within 7 days, please contact the clinic through MyChart or phone. If you have a critical or abnormal lab result, we will notify you by phone as soon as possible.  Submit refill requests through [employer] or call your pharmacy and they will forward the refill request to us. Please allow 3 business days for your refill to be completed.          Additional Information About Your Visit        MyChart Information     [employer]ens lets you send messages to your doctor, view your test results, renew your prescriptions, schedule appointments and more. To sign up, go to [email or web address] . Click on \"Log in\" on the left side of the screen, which will take you to the Welcome page. Then click on \"Sign up Now\" on the right side of the page.     You will be asked to enter the access code listed below, as well as some personal information. Please follow the directions to create your username and password.     Your access code is: " [email or web address]  Expires: 2018  3:30 PM     Your access code will  in 90 days. If you need help or a new code, please call your [place] clinic or [phone].        Care EveryWhere ID     This is your Care EveryWhere ID. This could be used by other organizations to access your [place] medical records  [id number]        Your Vitals Were     Pulse Pulse Oximetry BMI (Body Mass Index)             110 100% 27.4 kg/m2          Blood Pressure from Last 3 Encounters:   18 144/90   17 126/82   17 136/72    Weight from Last 3 Encounters:   18 82.3 kg (181 lb 6.4 oz) (83 %)*   18 82.7 kg (182 lb 6.4 oz) (84 %)*   17 82.7 kg (182 lb 6.4 oz) (84 %)*     * Growth percentiles are based on [facility] 2-20 Years data.              Today, you had the following     No orders found for display       Primary Care Provider Office Phone # Fax #    Cari Lal [phone]       XX RESIGNED XX  [facility] [address]        Equal Access to Services     TIM REED : Hadii ulises Todd, wakayyda aden, qaybta kaalmada kamilla, mayte finley . So [facility] [phone].    ATENCIÓN: Si habla español, tiene a anne disposición servicios gratuitos de asistencia lingüística. Llame al [phone].    We comply with applicable federal civil rights laws and [place] laws. We do not discriminate on the basis of race, color, national origin, age, disability, sex, sexual orientation, or gender identity.            Thank you!     Thank you for choosing [facility]  for your care. Our goal is always to provide you with excellent care. Hearing back from our patients is one way we can continue to improve our services. Please take a few minutes to complete the written survey that you may receive in the mail after your visit with us. Thank you!             Your Updated Medication List - Protect others around you: Learn how to safely use, store  and throw away your medicines at www.disposemymeds.org.          This list is accurate as of: 1/4/18 10:31 AM.  Always use your most recent med list.                   Brand Name Dispense Instructions for use Diagnosis    oxyCODONE-acetaminophen 5-325 MG per tablet    PERCOCET    30 tablet    Take 1-2 tablets by mouth every 4 hours as needed for pain (moderate to severe)    Post-op pain

## 2018-01-05 LAB — COPATH REPORT: NORMAL

## 2018-01-06 ENCOUNTER — HOSPITAL ENCOUNTER (EMERGENCY)
Facility: CLINIC | Age: 18
Discharge: HOME | End: 2018-01-06
Attending: FAMILY MEDICINE | Admitting: FAMILY MEDICINE
Payer: COMMERCIAL

## 2018-01-06 VITALS
TEMPERATURE: 99.8 F | OXYGEN SATURATION: 100 % | DIASTOLIC BLOOD PRESSURE: 84 MMHG | SYSTOLIC BLOOD PRESSURE: 124 MMHG | BODY MASS INDEX: 27.28 KG/M2 | WEIGHT: 180 LBS | HEIGHT: 68 IN | HEART RATE: 109 BPM | RESPIRATION RATE: 16 BRPM

## 2018-01-06 DIAGNOSIS — T14.8XXA BLEEDING FROM WOUND: ICD-10-CM

## 2018-01-06 DIAGNOSIS — G89.18 POST-OP PAIN: ICD-10-CM

## 2018-01-06 PROCEDURE — 25000132 ZZH RX MED GY IP 250 OP 250 PS 637: Performed by: FAMILY MEDICINE

## 2018-01-06 PROCEDURE — 99284 EMERGENCY DEPT VISIT MOD MDM: CPT | Mod: 25 | Performed by: FAMILY MEDICINE

## 2018-01-06 PROCEDURE — 12001 RPR S/N/AX/GEN/TRNK 2.5CM/<: CPT | Mod: Z6 | Performed by: FAMILY MEDICINE

## 2018-01-06 PROCEDURE — 12001 RPR S/N/AX/GEN/TRNK 2.5CM/<: CPT | Performed by: FAMILY MEDICINE

## 2018-01-06 PROCEDURE — 99283 EMERGENCY DEPT VISIT LOW MDM: CPT | Mod: 25 | Performed by: FAMILY MEDICINE

## 2018-01-06 RX ORDER — OXYCODONE AND ACETAMINOPHEN 5; 325 MG/1; MG/1
1-2 TABLET ORAL EVERY 4 HOURS PRN
Qty: 20 TABLET | Refills: 0 | Status: SHIPPED | OUTPATIENT
Start: 2018-01-06 | End: 2018-03-19

## 2018-01-06 RX ORDER — OXYCODONE HYDROCHLORIDE 5 MG/1
15 TABLET ORAL ONCE
Status: COMPLETED | OUTPATIENT
Start: 2018-01-06 | End: 2018-01-06

## 2018-01-06 RX ADMIN — OXYCODONE HYDROCHLORIDE 15 MG: 5 TABLET ORAL at 15:46

## 2018-01-06 NOTE — ED NOTES
Assisted Dr Fonseca with Packing removal and then with trying to control bleeding with cautery. Bleeding not controlled and pt in pain. Mom asked if we would recall Dr Acosta to have him come in. Holding pressure to control bleeding. Will get him something more for pain.

## 2018-01-06 NOTE — ED AVS SNAPSHOT
[facility] Emergency Department    [address] [place] [address]    Phone:  [phone]    Fax:  [phone]                                       Sanjay Burton   MRN: [id number]    Department:  [facility] Emergency Department   Date of Visit:  1/6/2018           Patient Information     Date Of Birth          2000        Your diagnoses for this visit were:     Bleeding from wound s/p pilonidal cystectomy    Post-op pain        You were seen by Yadira Fonseca MD.      Follow-up Information     Follow up with Enohc Acosta MD.    Specialty:  General Surgery    Why:  As needed    Contact information:    [address] [place] [id number] [phone]          Follow up In 2 days.    Why:  As needed        Discharge Instructions       We are sorry about your pain and your recurrent bleeding.    Hopefully the bleeding will stop now with the stitch that Dr. Acosta put in.    Continue wound care as you are doing.    Take Percocet as needed for pain.    Follow-up in the clinic with Dr. Acosta on Monday if needed.        Future Appointments        Provider Department Dept Phone Center    1/8/2018 9:00 AM Enoch Acosta MD [facility] [phone] [facility]      24 Hour Appointment Hotline       To make an appointment at any [facility], call [phone] ([phone]). If you don't have a family doctor or clinic, we will help you find one. [facility] are conveniently located to serve the needs of you and your family.             Review of your medicines      Our records show that you are taking the medicines listed below. If these are incorrect, please call your family doctor or clinic.        Dose / Directions Last dose taken    oxyCODONE-acetaminophen 5-325 MG per tablet   Commonly known as:  PERCOCET   Dose:  1-2 tablet   Quantity:  20 tablet        Take 1-2 tablets by mouth every 4 hours as needed for pain (moderate to severe)  "  Refills:  0                Prescriptions were sent or printed at these locations (1 Prescription)                   [facility], [place] - [address]    [address] , [facility] [address]    Telephone:  [phone]   Fax:  [phone]   Hours:                  Printed at Department/Unit printer (1 of 1)         oxyCODONE-acetaminophen (PERCOCET) 5-325 MG per tablet                Orders Needing Specimen Collection     None      Pending Results     No orders found from 1/4/2018 to 1/7/2018.            Pending Culture Results     No orders found from 1/4/2018 to 1/7/2018.            Pending Results Instructions     If you had any lab results that were not finalized at the time of your Discharge, you can call the ED Lab Result RN at [phone]. You will be contacted by this team for any positive Lab results or changes in treatment. The nurses are available 7 days a week from 10A to 6:30P.  You can leave a message 24 hours per day and they will return your call.        Thank you for choosing [facility]       Thank you for choosing [facility] for your care. Our goal is always to provide you with excellent care. Hearing back from our patients is one way we can continue to improve our services. Please take a few minutes to complete the written survey that you may receive in the mail after you visit with us. Thank you!        [employer] Information     [employer] lets you send messages to your doctor, view your test results, renew your prescriptions, schedule appointments and more. To sign up, go to [email or web address] . Click on \"Log in\" on the left side of the screen, which will take you to the Welcome page. Then click on \"Sign up Now\" on the right side of the page.     You will be asked to enter the access code listed below, as well as some personal information. Please follow the directions to create your username and password.     Your access code is: [email or web address]  Expires: 2/26/2018  3:30 " PM     Your access code will  in 90 days. If you need help or a new code, please call your [place] clinic or [phone].        Care EveryWhere ID     This is your Care EveryWhere ID. This could be used by other organizations to access your [place] medical records  [id number]        Equal Access to Services     TIM REED : Radha Todd, waaxda aden, qaybta kaalkate kohli, mayte hunter. So [facility] [phone].    ATENCIÓN: Si habla español, tiene a anne disposición servicios gratuitos de asistencia lingüística. Llame al [phone].    We comply with applicable federal civil rights laws and [place] laws. We do not discriminate on the basis of race, color, national origin, age, disability, sex, sexual orientation, or gender identity.            After Visit Summary       This is your record. Keep this with you and show to your community pharmacist(s) and doctor(s) at your next visit.

## 2018-01-06 NOTE — DISCHARGE INSTRUCTIONS
We are sorry about your pain and your recurrent bleeding.    Hopefully the bleeding will stop now with the stitch that Dr. Acosta put in.    Continue wound care as you are doing.    Take Percocet as needed for pain.    Follow-up in the clinic with Dr. Acosta on Monday if needed.

## 2018-01-06 NOTE — ED PROVIDER NOTES
"                                                            ED Provider Note     CC:     Chief Complaint   Patient presents with     Post-op Problem          History is obtained from the patient and his mother.  They are accompanied by his grandfather.    HPI: Sanjay Burton is a 17 year old male presenting with bleeding from his recent pilonidal cystectomy.  The wound was left open because of the chronic and severe nature of the pilonidal infection.  Patient needed to have cautery of an active bleeder on Thursday, and it apparently recurred this morning while he was in the shower.  There was a lot of blood coming from the wound and it has since been repacked.  Patient is also having a moderate amount of pain despite taking Percocet 1 tablet every 3 hours.        Patient Active Problem List   Diagnosis     NO ACTIVE PROBLEMS     Acne     Pilonidal cyst without abscess       MEDS:     No current facility-administered medications on file prior to encounter.   No current outpatient prescriptions on file prior to encounter.    Allergies: Review of patient's allergies indicates no known allergies.    Triage and ursing notes were reviewed.    ROS: All other systems were reviewed and are negative    Physical Exam:  Vitals:    01/06/18 1445 01/06/18 1625   BP: 115/87 124/84   Pulse: 129    Resp: 16    Temp: 99.8  F (37.7  C)    TempSrc: Oral    SpO2: 100%    Weight: 81.6 kg (180 lb)    Height: 1.727 m (5' 8\")      GENERAL APPEARANCE: Patient is laying prone, with mild to moderate pain  HEAD: atraumatic  RESP: Normal respiratory effort  CV: regular rate and rhythm, no significant murmurs or rubs  BUTTOCKS: Large open wound with active bleeding from the left superior lateral aspect.  SKIN: As above  NEURO: mentation and speech normal; no focal deficits    No results found for this or any previous visit (from the past 24 hour(s)).    Procedure Note: The patient's packing was removed, and there was instant bleeding from the " left superior lateral aspect.  Patient's wound was locally anesthetized with bupivacaine approximately 3 mL.  I attempted electrocautery, but the bleeding was too active to stop.  Mom asked me to stop any further attempts and to contact Dr. Acosta.      Impression:  Final diagnoses:   Bleeding from wound - s/p pilonidal cystectomy         ED Course & Medical Decision Making (Plan):  Sanjay Burton is a 17 year old male with bleeding from his recent pilonidal cystectomy site.  The wound was left open and mom is been changing the packing and dressing.  Today he was in the shower, and it started to bleed.  He was using fairly warm water.  Bleeding was somewhat controlled with pressure and packing, and he arrived in the emergency department.  There was very minimal bleeding at this time, and after the wound was unpacked, there is a arterial bleeder coming from the left superior lateral aspect of the open wound.  We attempted to cauterize the wound, but was unsuccessful.  After several attempts, mom asked me to stop and to contact Dr. Acosta.  Patient received 15 mg of oxycodone in the ED.  Dr. Acosta arrived in the emergency department approximately 20 minutes later, and at that time the bleeding had stopped with pressure.  We tried to locate the site of the bleeder, and he placed a figure-of-eight stitch, and during that time, caused the bleeding to recur.  With the stitch in place, the bleeding stopped.  Aftercare instructions were discussed with the patient by his surgeon and by me.  They have an appointment on Monday that they will keep.  Avoid hot showers as this may induce more bleeding.  Continue with packing and wound care.  Patient was given a refill of Percocet at the request of the patient and his surgeon.  20 tablets were written.  Return to the ED at any time if symptoms worsen..  Written after-visit summary and instructions were given at the time of discharge.        Rx of Percocet #20 written. Take  up to 2 tablets every 4 hours as needed for pain.      This note was completed in part using Dragon voice recognition, and may contain word and grammatical errors.        Yadira Fonseca MD  01/06/18 7057

## 2018-01-06 NOTE — ED NOTES
Pt having bleeding from Cyst removal brionna-rectal area on Wednesday. Area cauterized for bleeding on Friday. Now running a temp and pain not controlled and pt feeling woozy.

## 2018-01-06 NOTE — PROGRESS NOTES
Surgery    Pt is POD #2 from pilonidal cystectomy.  Called by ER pt removed packing this am and had bleeding that did not stop.  Came to ER.  Could not cauterize.  Called to see pt.  Are stopped by time I arrived.  Dr. Fonseca showed me the area of bleeding.  Figure of 8 4-0 vicryl places - bled during placement.  Stopped when suture tied.  No further bleeding.  Will f/u as scheduled.  Mom knows to hold direct pressure if occurs again.  Will renew pain meds.    Enoch Acosta MD, FACS

## 2018-01-06 NOTE — ED AVS SNAPSHOT
[facility] Emergency Department    [address] [place] [address]    Phone:  [phone]    Fax:  [phone]                                       Sanjay Burton   MRN: [id number]    Department:  [facility] Emergency Department   Date of Visit:  1/6/2018           After Visit Summary Signature Page     I have received my discharge instructions, and my questions have been answered. I have discussed any challenges I see with this plan with the nurse or doctor.    ..........................................................................................................................................  Patient/Patient Representative Signature      ..........................................................................................................................................  Patient Representative Print Name and Relationship to Patient    ..................................................               ................................................  Date                                            Time    ..........................................................................................................................................  Reviewed by Signature/Title    ...................................................              ..............................................  Date                                                            Time

## 2018-01-08 ENCOUNTER — OFFICE VISIT (OUTPATIENT)
Dept: SURGERY | Facility: OTHER | Age: 18
End: 2018-01-08
Payer: COMMERCIAL

## 2018-01-08 VITALS — WEIGHT: 176.4 LBS | HEART RATE: 74 BPM | TEMPERATURE: 98.2 F | BODY MASS INDEX: 26.82 KG/M2

## 2018-01-08 DIAGNOSIS — Z98.890 POST-OPERATIVE STATE: Primary | ICD-10-CM

## 2018-01-08 PROCEDURE — 99024 POSTOP FOLLOW-UP VISIT: CPT | Performed by: SPECIALIST

## 2018-01-08 NOTE — NURSING NOTE
"Chief Complaint   Patient presents with     Surgical Followup     Excision Pilonidal Cyst  DOS 1-3-2018       Initial Pulse 74  Temp 98.2  F (36.8  C) (Temporal)  Wt 80 kg (176 lb 6.4 oz)  BMI 26.82 kg/m2 Estimated body mass index is 26.82 kg/(m^2) as calculated from the following:    Height as of 1/6/18: 1.727 m (5' 8\").    Weight as of this encounter: 80 kg (176 lb 6.4 oz).  Medication Reconciliation: complete    "

## 2018-01-08 NOTE — LETTER
1/8/2018         RE: Sanjay Burton  [address] [place] [address]        Dear Colleague,    Thank you for referring your patient, Sanjay Burton, to the [facility]. Please see a copy of my visit note below.    F/U for pilonidal cyst excision    Subjective:  Pt was seen in ER over weekend for bleed - oversewn.  No further bleeding.  Pain controlled.  Was noted to have low grade fever in ER then developed a rash last night.  No further fever, chills or URI sx.    Objective:  B/P: Data Unavailable, T: 98.2, P: 74, R: Data Unavailable  Skin - scattered rash chest, back and hands.  Back: Excision site clean.    Path -   FINAL DIAGNOSIS:   Skin ellipse with attached portion of subcutaneous fat, excision:   - Pilonidal cyst with fibrosis and chronic inflammation.     Electronically signed out by:     Teodora Mendez M.D.       Assessment/Plan:  Pt s/p excision of pilonidal cyst.  No further bleeding.  Now with rash - unclear etiology - viral (low grade fever over weekend) vs meds.  Will start benadryl - if worsens will stop percocet and change pain meds.  F/U 2 weeks for wound check or sooner if problems develop.    Enoch Acosta MD, FACS        Again, thank you for allowing me to participate in the care of your patient.        Sincerely,        Enoch Acosta MD

## 2018-01-08 NOTE — MR AVS SNAPSHOT
"              After Visit Summary   1/8/2018    Sanjay Burton    MRN: [id number]           Patient Information     Date Of Birth          2000        Visit Information        Provider Department      1/8/2018 9:00 AM Enoch Acosta MD [facility]        Today's Diagnoses     Post-operative state    -  1       Follow-ups after your visit        Your next 10 appointments already scheduled     Jan 22, 2018  8:00 AM CST   Return Visit with Enoch Acosta MD   [facility] ([facility]    [address] [address] [phone]              Who to contact     If you have questions or need follow up information about today's clinic visit or your schedule please contact [facility] directly at [phone].  Normal or non-critical lab and imaging results will be communicated to you by MyChart, letter or phone within 4 business days after the clinic has received the results. If you do not hear from us within 7 days, please contact the clinic through MyChart or phone. If you have a critical or abnormal lab result, we will notify you by phone as soon as possible.  Submit refill requests through [employer] or call your pharmacy and they will forward the refill request to us. Please allow 3 business days for your refill to be completed.          Additional Information About Your Visit        MyChart Information     [employer] lets you send messages to your doctor, view your test results, renew your prescriptions, schedule appointments and more. To sign up, go to [email or web address] . Click on \"Log in\" on the left side of the screen, which will take you to the Welcome page. Then click on \"Sign up Now\" on the right side of the page.     You will be asked to enter the access code listed below, as well as some personal information. Please follow the directions to create your username and password.     Your access code is: " [email or web address]  Expires: 2018  3:30 PM     Your access code will  in 90 days. If you need help or a new code, please call your [place] clinic or [phone].        Care EveryWhere ID     This is your Care EveryWhere ID. This could be used by other organizations to access your [place] medical records  [id number]        Your Vitals Were     Pulse Temperature BMI (Body Mass Index)             74 98.2  F (36.8  C) (Temporal) 26.82 kg/m2          Blood Pressure from Last 3 Encounters:   18 124/84   18 144/90   17 126/82    Weight from Last 3 Encounters:   18 80 kg (176 lb 6.4 oz) (79 %)*   18 81.6 kg (180 lb) (82 %)*   18 82.3 kg (181 lb 6.4 oz) (83 %)*     * Growth percentiles are based on [facility] 2-20 Years data.              Today, you had the following     No orders found for display       Primary Care Provider Office Phone # Fax #    Cari Lal [phone]       XX RESIGNED XX  [facility] [address]        Equal Access to Services     TIM REED : Hadii ulises dugano Sobrianna, waaxda luqadaha, qaybta kaalmada adeegyada, mayte finley . So [facility] [phone].    ATENCIÓN: Si habla español, tiene a anne disposición servicios gratuitos de asistencia lingüística. Llame al [phone].    We comply with applicable federal civil rights laws and [place] laws. We do not discriminate on the basis of race, color, national origin, age, disability, sex, sexual orientation, or gender identity.            Thank you!     Thank you for choosing [facility]  for your care. Our goal is always to provide you with excellent care. Hearing back from our patients is one way we can continue to improve our services. Please take a few minutes to complete the written survey that you may receive in the mail after your visit with us. Thank you!             Your Updated Medication List - Protect others around you: Learn how to safely  use, store and throw away your medicines at www.disposemymeds.org.          This list is accurate as of: 1/8/18  9:08 AM.  Always use your most recent med list.                   Brand Name Dispense Instructions for use Diagnosis    oxyCODONE-acetaminophen 5-325 MG per tablet    PERCOCET    20 tablet    Take 1-2 tablets by mouth every 4 hours as needed for pain (moderate to severe)    Post-op pain

## 2018-01-10 ENCOUNTER — TELEPHONE (OUTPATIENT)
Dept: SURGERY | Facility: CLINIC | Age: 18
End: 2018-01-10

## 2018-01-10 DIAGNOSIS — G89.18 POST-OP PAIN: Primary | ICD-10-CM

## 2018-01-10 RX ORDER — HYDROCODONE BITARTRATE AND ACETAMINOPHEN 5; 325 MG/1; MG/1
1-2 TABLET ORAL EVERY 6 HOURS PRN
Qty: 30 TABLET | Refills: 0 | Status: SHIPPED | OUTPATIENT
Start: 2018-01-10 | End: 2018-05-07

## 2018-01-10 NOTE — TELEPHONE ENCOUNTER
Pt had pilonidal excision with Dr. Acosta. He had a body rash at f/u this week. He continues to have this rash over most of body. He is itchy and rash is red. He has been taking Benadryl 2 tabs after the Percocet. He has been staggering the Percocet. He tried ibuprofen but did not feel it helped but only took 1 dose. Drainage is decreasing. Denies cough, temp, runny nose. Feels  His pain is decreasing also.  Dr. Acosta notified. He will write a new RX. Family will pick this up at retail pharmacy at hospital. Pt and Mom are aware that if he notes any breathing changes, tongue changes to get to ED, Mom is aware of all of the these sym to watch for. I instructed pt to take the Ibuprofen 600mg q6hrs and start with one Norco tab and see how it goes. Will add percocet to allergy list. CARMINE Bourne

## 2018-01-10 NOTE — TELEPHONE ENCOUNTER
Reason for Call:      Detailed comments: [facility] pharmacy- Mom Berna is calling to get pain medication for Sanjay.     Phone Number Patient can be reached at: Home number on file [phone] (home)    Best Time: any    Can we leave a detailed message on this number? YES    Call taken on 1/10/2018 at 9:50 AM by Gauri Bowens

## 2018-01-22 ENCOUNTER — OFFICE VISIT (OUTPATIENT)
Dept: SURGERY | Facility: OTHER | Age: 18
End: 2018-01-22
Payer: COMMERCIAL

## 2018-01-22 VITALS — TEMPERATURE: 97.6 F | WEIGHT: 179 LBS | BODY MASS INDEX: 27.22 KG/M2 | HEART RATE: 88 BPM

## 2018-01-22 DIAGNOSIS — Z98.890 POST-OPERATIVE STATE: Primary | ICD-10-CM

## 2018-01-22 PROCEDURE — 99024 POSTOP FOLLOW-UP VISIT: CPT | Performed by: SPECIALIST

## 2018-01-22 NOTE — MR AVS SNAPSHOT
"              After Visit Summary   2018    Sanjay Burton    MRN: [id number]           Patient Information     Date Of Birth          2000        Visit Information        Provider Department      2018 8:00 AM Enoch Acosta MD [facility]         Follow-ups after your visit        Who to contact     If you have questions or need follow up information about today's clinic visit or your schedule please contact [facility] directly at [phone].  Normal or non-critical lab and imaging results will be communicated to you by MyChart, letter or phone within 4 business days after the clinic has received the results. If you do not hear from us within 7 days, please contact the clinic through Avanserahart or phone. If you have a critical or abnormal lab result, we will notify you by phone as soon as possible.  Submit refill requests through AMGas or call your pharmacy and they will forward the refill request to us. Please allow 3 business days for your refill to be completed.          Additional Information About Your Visit        [facility] Information     AMGas lets you send messages to your doctor, view your test results, renew your prescriptions, schedule appointments and more. To sign up, go to [email or web address] . Click on \"Log in\" on the left side of the screen, which will take you to the Welcome page. Then click on \"Sign up Now\" on the right side of the page.     You will be asked to enter the access code listed below, as well as some personal information. Please follow the directions to create your username and password.     Your access code is: [email or web address]  Expires: 2018  3:30 PM     Your access code will  in 90 days. If you need help or a new code, please call your [facility] or [phone].        Care EveryWhere ID     This is your Care EveryWhere ID. This could be used by other organizations to access your [place] medical " records  [id number]        Your Vitals Were     Pulse Temperature BMI (Body Mass Index)             88 97.6  F (36.4  C) (Temporal) 27.22 kg/m2          Blood Pressure from Last 3 Encounters:   01/06/18 124/84   01/03/18 144/90   12/19/17 126/82    Weight from Last 3 Encounters:   01/22/18 81.2 kg (179 lb) (81 %)*   01/08/18 80 kg (176 lb 6.4 oz) (79 %)*   01/06/18 81.6 kg (180 lb) (82 %)*     * Growth percentiles are based on [facility] 2-20 Years data.              Today, you had the following     No orders found for display       Primary Care Provider Office Phone # Fax #    Cari Lal [phone]       XX RESIGNED XX  [facility] [address]        Equal Access to Services     [facility] : Hadii ulises Todd, waaxda aden, qaybta kaalmada kamilla, mayte finley . So [facility] [phone].    ATENCIÓN: Si habla español, tiene a anne disposición servicios gratuitos de asistencia lingüística. Llame al [phone].    We comply with applicable federal civil rights laws and [place] laws. We do not discriminate on the basis of race, color, national origin, age, disability, sex, sexual orientation, or gender identity.            Thank you!     Thank you for choosing [facility]  for your care. Our goal is always to provide you with excellent care. Hearing back from our patients is one way we can continue to improve our services. Please take a few minutes to complete the written survey that you may receive in the mail after your visit with us. Thank you!             Your Updated Medication List - Protect others around you: Learn how to safely use, store and throw away your medicines at www.disposemymeds.org.          This list is accurate as of: 1/22/18  8:02 AM.  Always use your most recent med list.                   Brand Name Dispense Instructions for use Diagnosis    HYDROcodone-acetaminophen 5-325 MG per tablet    NORCO    30 tablet    Take 1-2  tablets by mouth every 6 hours as needed for moderate to severe pain    Post-op pain       oxyCODONE-acetaminophen 5-325 MG per tablet    PERCOCET    20 tablet    Take 1-2 tablets by mouth every 4 hours as needed for pain (moderate to severe)    Post-op pain

## 2018-01-22 NOTE — NURSING NOTE
"Chief Complaint   Patient presents with     Surgical Followup     Excision Pilonidal Cyst  DOS 1-3-2018       Initial Pulse 88  Temp 97.6  F (36.4  C) (Temporal)  Wt 81.2 kg (179 lb)  BMI 27.22 kg/m2 Estimated body mass index is 27.22 kg/(m^2) as calculated from the following:    Height as of 1/6/18: 1.727 m (5' 8\").    Weight as of this encounter: 81.2 kg (179 lb).  Medication Reconciliation: complete    "

## 2018-01-22 NOTE — LETTER
1/22/2018         RE: Sanjay Burton  [address] [place] [facility] [address]        Dear Colleague,    Thank you for referring your patient, Sanjay Burton, to the [facility]. Please see a copy of my visit note below.    F/U for pilonidal cyst excision    Subjective:  P t feels good.  No further bleeding. Pain greatly improved      Objective:  B/P: Data Unavailable, T: 97.6, P: 88, R: Data Unavailable  Back: Excision site clean.  100% granulation tissue - filled in nicely.      Path -   FINAL DIAGNOSIS:   Skin ellipse with attached portion of subcutaneous fat, excision:   - Pilonidal cyst with fibrosis and chronic inflammation.     Electronically signed out by:     Teodora Mendez M.D.       Assessment/Plan:  Pt s/p excision of pilonidal cyst.  No further bleeding.  Wound filling in nicely.   F/U 2-3 weeks for wound check or sooner if problems develop.    Enoch Acosta MD, FACS        Again, thank you for allowing me to participate in the care of your patient.        Sincerely,        Enoch Acosta MD

## 2018-01-22 NOTE — PROGRESS NOTES
F/U for pilonidal cyst excision    Subjective:  P t feels good.  No further bleeding. Pain greatly improved      Objective:  B/P: Data Unavailable, T: 97.6, P: 88, R: Data Unavailable  Back: Excision site clean.  100% granulation tissue - filled in nicely.      Path -   FINAL DIAGNOSIS:   Skin ellipse with attached portion of subcutaneous fat, excision:   - Pilonidal cyst with fibrosis and chronic inflammation.     Electronically signed out by:     Teodora Mendez M.D.       Assessment/Plan:  Pt s/p excision of pilonidal cyst.  No further bleeding.  Wound filling in nicely.   F/U 2-3 weeks for wound check or sooner if problems develop.    Enoch Acosta MD, FACS

## 2018-02-12 ENCOUNTER — OFFICE VISIT (OUTPATIENT)
Dept: SURGERY | Facility: OTHER | Age: 18
End: 2018-02-12
Payer: COMMERCIAL

## 2018-02-12 VITALS — HEART RATE: 76 BPM | BODY MASS INDEX: 26.76 KG/M2 | TEMPERATURE: 98.6 F | WEIGHT: 176 LBS

## 2018-02-12 DIAGNOSIS — Z98.890 POST-OPERATIVE STATE: Primary | ICD-10-CM

## 2018-02-12 PROCEDURE — 99024 POSTOP FOLLOW-UP VISIT: CPT | Performed by: SPECIALIST

## 2018-02-12 NOTE — MR AVS SNAPSHOT
"              After Visit Summary   2018    Sanjay Burton    MRN: [id number]           Patient Information     Date Of Birth          2000        Visit Information        Provider Department      2018 10:15 AM Enoch Acosta MD [facility]        Today's Diagnoses     Post-operative state    -  1       Follow-ups after your visit        Who to contact     If you have questions or need follow up information about today's clinic visit or your schedule please contact [facility] directly at [phone].  Normal or non-critical lab and imaging results will be communicated to you by Health: El[place]art, letter or phone within 4 business days after the clinic has received the results. If you do not hear from us within 7 days, please contact the clinic through ShopYourWorldt or phone. If you have a critical or abnormal lab result, we will notify you by phone as soon as possible.  Submit refill requests through Gr[employer] or call your pharmacy and they will forward the refill request to us. Please allow 3 business days for your refill to be completed.          Additional Information About Your Visit        MyChart Information     Gr[employer] lets you send messages to your doctor, view your test results, renew your prescriptions, schedule appointments and more. To sign up, go to [email or web address] . Click on \"Log in\" on the left side of the screen, which will take you to the Welcome page. Then click on \"Sign up Now\" on the right side of the page.     You will be asked to enter the access code listed below, as well as some personal information. Please follow the directions to create your username and password.     Your access code is: [email or web address]  Expires: 2018  3:30 PM     Your access code will  in 90 days. If you need help or a new code, please call your [facility] or [phone].        Care EveryWhere ID     This is your Care EveryWhere ID. This could be used by other " organizations to access your [place] medical records  [id number]        Your Vitals Were     Pulse Temperature BMI (Body Mass Index)             76 98.6  F (37  C) (Temporal) 26.76 kg/m2          Blood Pressure from Last 3 Encounters:   01/06/18 124/84   01/03/18 144/90   12/19/17 126/82    Weight from Last 3 Encounters:   02/12/18 79.8 kg (176 lb) (78 %)*   01/22/18 81.2 kg (179 lb) (81 %)*   01/08/18 80 kg (176 lb 6.4 oz) (79 %)*     * Growth percentiles are based on [facility] 2-20 Years data.              Today, you had the following     No orders found for display       Primary Care Provider Office Phone # Fax #    Cari Lal [phone]       XX RESIGNED XX  [facility] [address]        Equal Access to Services     [facility]: Hadii ulises dugano Sobrianna, waaxda luqadaha, qaybta kaalmada cloveryaaltagracia, mayte finley . So [facility] [phone].    ATENCIÓN: Si habla español, tiene a anne disposición servicios gratuitos de asistencia lingüística. Llame al [phone].    We comply with applicable federal civil rights laws and [place] laws. We do not discriminate on the basis of race, color, national origin, age, disability, sex, sexual orientation, or gender identity.            Thank you!     Thank you for choosing [facility]  for your care. Our goal is always to provide you with excellent care. Hearing back from our patients is one way we can continue to improve our services. Please take a few minutes to complete the written survey that you may receive in the mail after your visit with us. Thank you!             Your Updated Medication List - Protect others around you: Learn how to safely use, store and throw away your medicines at www.disposemymeds.org.          This list is accurate as of 2/12/18 10:28 AM.  Always use your most recent med list.                   Brand Name Dispense Instructions for use Diagnosis    HYDROcodone-acetaminophen 5-325 MG  per tablet    NORCO    30 tablet    Take 1-2 tablets by mouth every 6 hours as needed for moderate to severe pain    Post-op pain       oxyCODONE-acetaminophen 5-325 MG per tablet    PERCOCET    20 tablet    Take 1-2 tablets by mouth every 4 hours as needed for pain (moderate to severe)    Post-op pain

## 2018-02-12 NOTE — LETTER
2/12/2018         RE: Sanjay Burton  [address] [facility] [address]        Dear Colleague,    Thank you for referring your patient, Sanjay Burton, to the [facility]. Please see a copy of my visit note below.    F/U for pilonidal cyst excision    Subjective:  P t feels good.  No further bleeding. No Pain      Objective:  B/P: Data Unavailable, T: 98.6, P: 76, R: Data Unavailable  Back: Excision site clean.  100% granulation tissue - filled in nicely.  3x0.3 cm 100% granulation      Path -   FINAL DIAGNOSIS:   Skin ellipse with attached portion of subcutaneous fat, excision:   - Pilonidal cyst with fibrosis and chronic inflammation.     Electronically signed out by:     Teodora Mendez M.D.       Assessment/Plan:  Pt s/p excision of pilonidal cyst.  No further bleeding.  Wound filling in nicely.   F/U 2-3 weeks for wound check or sooner if problems develop.    Enoch Acosta MD, FACS        Again, thank you for allowing me to participate in the care of your patient.        Sincerely,        Enoch Acosta MD

## 2018-02-12 NOTE — PROGRESS NOTES
F/U for pilonidal cyst excision    Subjective:  P t feels good.  No further bleeding. No Pain      Objective:  B/P: Data Unavailable, T: 98.6, P: 76, R: Data Unavailable  Back: Excision site clean.  100% granulation tissue - filled in nicely.  3x0.3 cm 100% granulation      Path -   FINAL DIAGNOSIS:   Skin ellipse with attached portion of subcutaneous fat, excision:   - Pilonidal cyst with fibrosis and chronic inflammation.     Electronically signed out by:     Teodora Mendez M.D.       Assessment/Plan:  Pt s/p excision of pilonidal cyst.  No further bleeding.  Wound filling in nicely.   F/U 2-3 weeks for wound check or sooner if problems develop.    Enoch Acosta MD, FACS

## 2018-02-12 NOTE — NURSING NOTE
"Chief Complaint   Patient presents with     Surgical Followup     F/U for pilonidal cyst excision  DOS 1-3-2018     WOUND CARE       Initial Pulse 76  Temp 98.6  F (37  C) (Temporal)  Wt 79.8 kg (176 lb)  BMI 26.76 kg/m2 Estimated body mass index is 26.76 kg/(m^2) as calculated from the following:    Height as of 1/6/18: 1.727 m (5' 8\").    Weight as of this encounter: 79.8 kg (176 lb).  Medication Reconciliation: complete      "

## 2018-03-19 ENCOUNTER — OFFICE VISIT (OUTPATIENT)
Dept: SURGERY | Facility: OTHER | Age: 18
End: 2018-03-19
Payer: COMMERCIAL

## 2018-03-19 ENCOUNTER — TELEPHONE (OUTPATIENT)
Dept: FAMILY MEDICINE | Facility: OTHER | Age: 18
End: 2018-03-19

## 2018-03-19 VITALS
DIASTOLIC BLOOD PRESSURE: 74 MMHG | BODY MASS INDEX: 27.52 KG/M2 | WEIGHT: 181 LBS | SYSTOLIC BLOOD PRESSURE: 122 MMHG | TEMPERATURE: 97.7 F

## 2018-03-19 DIAGNOSIS — Z98.890 POST-OPERATIVE STATE: Primary | ICD-10-CM

## 2018-03-19 PROCEDURE — 99024 POSTOP FOLLOW-UP VISIT: CPT | Performed by: SPECIALIST

## 2018-03-19 ASSESSMENT — PAIN SCALES - GENERAL: PAINLEVEL: MILD PAIN (2)

## 2018-03-19 NOTE — MR AVS SNAPSHOT
"              After Visit Summary   3/19/2018    Sanjay Burton    MRN: [id number]           Patient Information     Date Of Birth          2000        Visit Information        Provider Department      3/19/2018 11:15 AM Enoch Acosta MD [facility]         Follow-ups after your visit        Your next 10 appointments already scheduled     Mar 19, 2018 11:15 AM CDT   Return Visit with Enoch Acosta MD   [facility]    [address] [facility] [address] [phone]              Who to contact     If you have questions or need follow up information about today's clinic visit or your schedule please contact [facility] directly at [phone].  Normal or non-critical lab and imaging results will be communicated to you by MyChart, letter or phone within 4 business days after the clinic has received the results. If you do not hear from us within 7 days, please contact the clinic through MyChart or phone. If you have a critical or abnormal lab result, we will notify you by phone as soon as possible.  Submit refill requests through Mytonomy or call your pharmacy and they will forward the refill request to us. Please allow 3 business days for your refill to be completed.          Additional Information About Your Visit        "[employer]"hart Information     Mytonomy lets you send messages to your doctor, view your test results, renew your prescriptions, schedule appointments and more. To sign up, go to [email or web address] . Click on \"Log in\" on the left side of the screen, which will take you to the Welcome page. Then click on \"Sign up Now\" on the right side of the page.     You will be asked to enter the access code listed below, as well as some personal information. Please follow the directions to create your username and password.     Your access code is: [email or web address]  Expires: 6/17/2018 11:05 AM     Your access code will "  in 90 days. If you need help or a new code, please call your [place] clinic or [phone].        Care EveryWhere ID     This is your Care EveryWhere ID. This could be used by other organizations to access your [place] medical records  [id number]        Your Vitals Were     Temperature BMI (Body Mass Index)                97.7  F (36.5  C) (Temporal) 27.52 kg/m2           Blood Pressure from Last 3 Encounters:   18 122/74   18 124/84   18 144/90    Weight from Last 3 Encounters:   18 82.1 kg (181 lb) (82 %)*   18 79.8 kg (176 lb) (78 %)*   18 81.2 kg (179 lb) (81 %)*     * Growth percentiles are based on [facility] 2-20 Years data.              Today, you had the following     No orders found for display       Primary Care Provider Office Phone # Fax #    Cari Lal [phone]       XX RESIGNED XX  [facility] [address]        Equal Access to Services     [facility]: Hadii ulises nicolas hadgo Sobrianna, waaxda lusrinivas, qaybta kaalmaaltagracia kohli, mayte finley . So [facility] [phone].    ATENCIÓN: Si habla español, tiene a anne disposición servicios gratuitos de asistencia lingüística. Valorie al [phone].    We comply with applicable federal civil rights laws and [place] laws. We do not discriminate on the basis of race, color, national origin, age, disability, sex, sexual orientation, or gender identity.            Thank you!     Thank you for choosing [facility]  for your care. Our goal is always to provide you with excellent care. Hearing back from our patients is one way we can continue to improve our services. Please take a few minutes to complete the written survey that you may receive in the mail after your visit with us. Thank you!             Your Updated Medication List - Protect others around you: Learn how to safely use, store and throw away your medicines at www.disposemymeds.org.          This list  is accurate as of 3/19/18 11:05 AM.  Always use your most recent med list.                   Brand Name Dispense Instructions for use Diagnosis    HYDROcodone-acetaminophen 5-325 MG per tablet    NORCO    30 tablet    Take 1-2 tablets by mouth every 6 hours as needed for moderate to severe pain    Post-op pain

## 2018-03-19 NOTE — PROGRESS NOTES
F/U for pilonidal cyst excision    Subjective:  P t feels good. Wound was closed - Patient squatted to  freezer - felt a tearing and some bleeding - ? Re-opened wound.    Objective:  B/P: 122/74, T: 97.7, P: Data Unavailable, R: Data Unavailable  Back: Excision site clean.  100% granulation tissue - filled in nicely.  3x0.5 cm 100% granulation  Torn epithelialization      Path -   FINAL DIAGNOSIS:   Skin ellipse with attached portion of subcutaneous fat, excision:   - Pilonidal cyst with fibrosis and chronic inflammation.     Electronically signed out by:     Teodora Mendez M.D.       Assessment/Plan:  Pt s/p excision of pilonidal cyst.  No further bleeding.  Wound filling in nicely.   Will start silvercel daily to wound.   F/U 2-3 weeks for wound check or sooner if problems develop.    Enoch Acosta MD, FACS

## 2018-03-19 NOTE — TELEPHONE ENCOUNTER
Left message for patient to return call to RN.    RN: please message Aranza for possible work in.    Jeronimo Lucero, RN, BSN

## 2018-03-19 NOTE — TELEPHONE ENCOUNTER
I spoke with patient.   He was previously seeing Dr. Acosta for a pilonidal cyst.   The wound was healing well on it's own until last week, when he hit it on something and it broke open again.   The wound bled slightly, but none since.   There is no pain.   He is unsure if it is infected and notes something about pus like dicharge.   Patient was rescheduled from Rachel Dias to Dr. Acosta this morning in [place].     Next 5 appointments (look out 90 days)     Mar 19, 2018 11:15 AM CDT   [facility]noch [facility]   [facility]    [address] [facility] [address] [facility] [address] [phone]                Di Jensen, CARMINE, BSN

## 2018-03-19 NOTE — NURSING NOTE
"Chief Complaint   Patient presents with     WOUND CARE     RECHECK     Excision Pilonidal Cyst  DOS 1-3-2018       Initial /74 (BP Location: Right arm, Patient Position: Sitting, Cuff Size: Adult Regular)  Temp 97.7  F (36.5  C) (Temporal)  Wt 82.1 kg (181 lb)  BMI 27.52 kg/m2 Estimated body mass index is 27.52 kg/(m^2) as calculated from the following:    Height as of 1/6/18: 1.727 m (5' 8\").    Weight as of this encounter: 82.1 kg (181 lb).  Medication Reconciliation: complete    "

## 2018-03-19 NOTE — TELEPHONE ENCOUNTER
RN please call to triage visit for today to see if it is appropriate that he see me. He had a pilonidal cyst removed by Dr. Acosta and I think he may benefit from seeing him instead. If there are any signs of infection, that would be a reason to see me but otherwise in terms of wound healing he should see Dr. Acosta.  Thanks,  Robyn Dias, CNP

## 2018-04-09 ENCOUNTER — OFFICE VISIT (OUTPATIENT)
Dept: SURGERY | Facility: OTHER | Age: 18
End: 2018-04-09
Payer: COMMERCIAL

## 2018-04-09 VITALS — TEMPERATURE: 98 F | SYSTOLIC BLOOD PRESSURE: 122 MMHG | HEART RATE: 70 BPM | DIASTOLIC BLOOD PRESSURE: 64 MMHG

## 2018-04-09 DIAGNOSIS — Z98.890 POST-OPERATIVE STATE: Primary | ICD-10-CM

## 2018-04-09 PROCEDURE — 99024 POSTOP FOLLOW-UP VISIT: CPT | Performed by: SPECIALIST

## 2018-04-09 NOTE — LETTER
4/9/2018         RE: Sanjay Burton  [address] [place] [address]        Dear Colleague,    Thank you for referring your patient, Sanjay Burton, to the [facility]. Please see a copy of my visit note below.    F/U for pilonidal cyst excision    Subjective:  Pt feels good. Wound was closed - Patient squatted to  freezer and re-opened wound.  AGNO4 applied last visit.    Objective:  B/P: 122/64, T: 98, P: 70, R: Data Unavailable  Back: Excision site clean.  100% granulation tissue - filled in nicely.  3x0.5 cm 100% granulation  AGN04 applied    Path -   FINAL DIAGNOSIS:   Skin ellipse with attached portion of subcutaneous fat, excision:   - Pilonidal cyst with fibrosis and chronic inflammation.     Electronically signed out by:     Teodora Mendez M.D.       Assessment/Plan:  Pt s/p excision of pilonidal cyst.   Wound torn open last visit.  .  AGNO4 applied.  Will cont silvercel daily to wound.   F/U 2 weeks for wound check or sooner if problems develop.    Enoch Acosta MD, FACS        Again, thank you for allowing me to participate in the care of your patient.        Sincerely,        Enoch Acosta MD

## 2018-04-09 NOTE — NURSING NOTE
"Chief Complaint   Patient presents with     RECHECK     pilonidal cyst, wound-silvercell       Initial /64 (BP Location: Left arm, Patient Position: Sitting, Cuff Size: Adult Regular)  Pulse 70  Temp 98  F (36.7  C) (Temporal) Estimated body mass index is 27.52 kg/(m^2) as calculated from the following:    Height as of 1/6/18: 1.727 m (5' 8\").    Weight as of 3/19/18: 82.1 kg (181 lb).  Medication Reconciliation: complete    "

## 2018-04-09 NOTE — MR AVS SNAPSHOT
"              After Visit Summary   2018    Sanjay Burton    MRN: [id number]           Patient Information     Date Of Birth          2000        Visit Information        Provider Department      2018 8:00 AM Enoch Acosta MD [facility]         Follow-ups after your visit        Who to contact     If you have questions or need follow up information about today's clinic visit or your schedule please contact [facility] directly at [phone].  Normal or non-critical lab and imaging results will be communicated to you by MyChart, letter or phone within 4 business days after the clinic has received the results. If you do not hear from us within 7 days, please contact the clinic through [employer] or phone. If you have a critical or abnormal lab result, we will notify you by phone as soon as possible.  Submit refill requests through [employer] or call your pharmacy and they will forward the refill request to us. Please allow 3 business days for your refill to be completed.          Additional Information About Your Visit        [facility] [facility] Information     [employer] lets you send messages to your doctor, view your test results, renew your prescriptions, schedule appointments and more. To sign up, go to [email or web address] . Click on \"Log in\" on the left side of the screen, which will take you to the Welcome page. Then click on \"Sign up Now\" on the right side of the page.     You will be asked to enter the access code listed below, as well as some personal information. Please follow the directions to create your username and password.     Your access code is: [email or web address]  Expires: 2018 11:05 AM     Your access code will  in 90 days. If you need help or a new code, please call your [facility] or [phone].        Care EveryWhere ID     This is your Care EveryWhere ID. This could be used by other organizations to access your [place] medical " records  [id number]        Your Vitals Were     Pulse Temperature                70 98  F (36.7  C) (Temporal)           Blood Pressure from Last 3 Encounters:   04/09/18 122/64   03/19/18 122/74   01/06/18 124/84    Weight from Last 3 Encounters:   03/19/18 82.1 kg (181 lb) (82 %)*   02/12/18 79.8 kg (176 lb) (78 %)*   01/22/18 81.2 kg (179 lb) (81 %)*     * Growth percentiles are based on [facility] 2-20 Years data.              Today, you had the following     No orders found for display       Primary Care Provider Office Phone # Fax #    Cari Lal [phone]       XX RESIGNED XX  [facility] [address]        Equal Access to Services     TIM REED : Hadii ulises Todd, waaxda luqadaha, qaybta kaalmada kamilla, mayte finley . So [facility] [phone].    ATENCIÓN: Si habla español, tiene a anne disposición servicios gratuitos de asistencia lingüística. Llame al [phone].    We comply with applicable federal civil rights laws and [place] laws. We do not discriminate on the basis of race, color, national origin, age, disability, sex, sexual orientation, or gender identity.            Thank you!     Thank you for choosing [facility]  for your care. Our goal is always to provide you with excellent care. Hearing back from our patients is one way we can continue to improve our services. Please take a few minutes to complete the written survey that you may receive in the mail after your visit with us. Thank you!             Your Updated Medication List - Protect others around you: Learn how to safely use, store and throw away your medicines at www.disposemymeds.org.          This list is accurate as of 4/9/18  8:18 AM.  Always use your most recent med list.                   Brand Name Dispense Instructions for use Diagnosis    HYDROcodone-acetaminophen 5-325 MG per tablet    NORCO    30 tablet    Take 1-2 tablets by mouth every 6 hours as  needed for moderate to severe pain    Post-op pain

## 2018-04-09 NOTE — PROGRESS NOTES
F/U for pilonidal cyst excision    Subjective:  Pt feels good. Wound was closed - Patient squatted to  freezer and re-opened wound.  AGNO4 applied last visit.    Objective:  B/P: 122/64, T: 98, P: 70, R: Data Unavailable  Back: Excision site clean.  100% granulation tissue - filled in nicely.  3x0.5 cm 100% granulation  AGN04 applied    Path -   FINAL DIAGNOSIS:   Skin ellipse with attached portion of subcutaneous fat, excision:   - Pilonidal cyst with fibrosis and chronic inflammation.     Electronically signed out by:     Teodora Mendez M.D.       Assessment/Plan:  Pt s/p excision of pilonidal cyst.   Wound torn open last visit.  .  AGNO4 applied.  Will cont silvercel daily to wound.   F/U 2 weeks for wound check or sooner if problems develop.    Enoch Acosta MD, FACS

## 2018-04-23 ENCOUNTER — OFFICE VISIT (OUTPATIENT)
Dept: SURGERY | Facility: OTHER | Age: 18
End: 2018-04-23
Payer: COMMERCIAL

## 2018-04-23 VITALS
BODY MASS INDEX: 27.06 KG/M2 | SYSTOLIC BLOOD PRESSURE: 130 MMHG | DIASTOLIC BLOOD PRESSURE: 70 MMHG | WEIGHT: 178 LBS | TEMPERATURE: 97.6 F

## 2018-04-23 DIAGNOSIS — Z98.890 POST-OPERATIVE STATE: Primary | ICD-10-CM

## 2018-04-23 PROCEDURE — 99212 OFFICE O/P EST SF 10 MIN: CPT | Mod: 25 | Performed by: SPECIALIST

## 2018-04-23 PROCEDURE — 17250 CHEM CAUT OF GRANLTJ TISSUE: CPT | Performed by: SPECIALIST

## 2018-04-23 NOTE — NURSING NOTE
"Chief Complaint   Patient presents with     RECHECK     pilonidal cyst, wound-silvercell       Initial /70 (BP Location: Right arm, Patient Position: Sitting, Cuff Size: Adult Regular)  Temp 97.6  F (36.4  C) (Temporal)  Wt 80.7 kg (178 lb)  BMI 27.06 kg/m2 Estimated body mass index is 27.06 kg/(m^2) as calculated from the following:    Height as of 1/6/18: 1.727 m (5' 8\").    Weight as of this encounter: 80.7 kg (178 lb).  Medication Reconciliation: complete    "

## 2018-04-23 NOTE — LETTER
4/23/2018         RE: Sanjay Burton  [address] [facility] [address]        Dear Colleague,    Thank you for referring your patient, Sanjay Burton, to the [facility]. Please see a copy of my visit note below.    F/U for pilonidal cyst excision    Subjective:  Pt feels good. Wound was closed - Patient squatted to  freezer and re-opened wound.  AGNO4 applied last visit.    Objective:  B/P: 130/70, T: 97.6, P: Data Unavailable, R: Data Unavailable  Back: Excision site clean.  100% granulation tissue - filled in nicely.  Split into 2 wounds with a skin bridge 2x0.5 and 0.5x0.5 cm 100% granulation  AGN04 applied    Path -   FINAL DIAGNOSIS:   Skin ellipse with attached portion of subcutaneous fat, excision:   - Pilonidal cyst with fibrosis and chronic inflammation.     Electronically signed out by:     Teodora Mendez M.D.       Assessment/Plan:  Pt s/p excision of pilonidal cyst.   Wound torn open..   and smaller this week.  AGNO4 applied.  Will cont silvercel daily to wound.   F/U 2 weeks for wound check or sooner if problems develop.    Enoch Acosta MD, FACS        Again, thank you for allowing me to participate in the care of your patient.        Sincerely,        Enoch Acosta MD

## 2018-04-23 NOTE — PROGRESS NOTES
F/U for pilonidal cyst excision    Subjective:  Pt feels good. Wound was closed - Patient squatted to  freezer and re-opened wound.  AGNO4 applied last visit.    Objective:  B/P: 130/70, T: 97.6, P: Data Unavailable, R: Data Unavailable  Back: Excision site clean.  100% granulation tissue - filled in nicely.  Split into 2 wounds with a skin bridge 2x0.5 and 0.5x0.5 cm 100% granulation  AGN04 applied    Path -   FINAL DIAGNOSIS:   Skin ellipse with attached portion of subcutaneous fat, excision:   - Pilonidal cyst with fibrosis and chronic inflammation.     Electronically signed out by:     Teodora Mendez M.D.       Assessment/Plan:  Pt s/p excision of pilonidal cyst.   Wound torn open..   and smaller this week.  AGNO4 applied.  Will cont silvercel daily to wound.   F/U 2 weeks for wound check or sooner if problems develop.    Enoch Acosta MD, FACS

## 2018-05-07 ENCOUNTER — OFFICE VISIT (OUTPATIENT)
Dept: SURGERY | Facility: CLINIC | Age: 18
End: 2018-05-07
Payer: COMMERCIAL

## 2018-05-07 VITALS — HEART RATE: 119 BPM | WEIGHT: 178.5 LBS | BODY MASS INDEX: 27.14 KG/M2 | OXYGEN SATURATION: 100 % | TEMPERATURE: 98.8 F

## 2018-05-07 DIAGNOSIS — Z98.890 POST-OPERATIVE STATE: Primary | ICD-10-CM

## 2018-05-07 PROCEDURE — 17250 CHEM CAUT OF GRANLTJ TISSUE: CPT | Performed by: SPECIALIST

## 2018-05-07 PROCEDURE — 99212 OFFICE O/P EST SF 10 MIN: CPT | Mod: 25 | Performed by: SPECIALIST

## 2018-05-07 NOTE — NURSING NOTE
"Sanjay Burton is a 17 year old male who presents for:  Chief Complaint   Patient presents with     WOUND CARE     F/U for pilonidal cyst excision-wound      RECHECK        Initial Vitals:  Pulse 119  Temp 98.8  F (37.1  C) (Temporal)  Wt 81 kg (178 lb 8 oz)  SpO2 100%  BMI 27.14 kg/m2 Estimated body mass index is 27.14 kg/(m^2) as calculated from the following:    Height as of 1/6/18: 1.727 m (5' 8\").    Weight as of this encounter: 81 kg (178 lb 8 oz).. Body surface area is 1.97 meters squared. BP completed using cuff size: NA (Not Taken)  Data Unavailable    Do you feel safe in your environment?  Yes  Do you need any refills today? No    Nursing Comments: none        Aranza Mcwilliams cma    "

## 2018-05-07 NOTE — LETTER
5/7/2018         RE: Sanjay Burton  [address] [facility] [address]        Dear Colleague,    Thank you for referring your patient, Sanjay Burton, to the [facility]. Please see a copy of my visit note below.    F/U for pilonidal cyst excision    Subjective:  Pt feels good. Wound was closed - Patient squatted to  freezer and re-opened wound.  AGNO4 applied last visit.    Objective:  B/P: Data Unavailable, T: 98.8, P: 119, R: Data Unavailable  Back: Excision site clean.  100% granulation tissue - filled in nicely.  Split into 2 wounds with a skin bridge HEALEDand 0.2x0.1 cm 100% granulation  AGN04 applied    Path -   FINAL DIAGNOSIS:   Skin ellipse with attached portion of subcutaneous fat, excision:   - Pilonidal cyst with fibrosis and chronic inflammation.     Electronically signed out by:     Teodora Mendez M.D.       Assessment/Plan:  Pt s/p excision of pilonidal cyst.   Wound torn open..   and smaller this week.  AGNO4 applied.  Will cont silvercel daily to wound.   F/U 2 weeks for wound check or sooner if problems develop.    Enoch Acosta MD, FACS        Again, thank you for allowing me to participate in the care of your patient.        Sincerely,        Enoch Acosta MD

## 2018-05-07 NOTE — PROGRESS NOTES
F/U for pilonidal cyst excision    Subjective:  Pt feels good. Wound was closed - Patient squatted to  freezer and re-opened wound.  AGNO4 applied last visit.    Objective:  B/P: Data Unavailable, T: 98.8, P: 119, R: Data Unavailable  Back: Excision site clean.  100% granulation tissue - filled in nicely.  Split into 2 wounds with a skin bridge HEALEDand 0.2x0.1 cm 100% granulation  AGN04 applied    Path -   FINAL DIAGNOSIS:   Skin ellipse with attached portion of subcutaneous fat, excision:   - Pilonidal cyst with fibrosis and chronic inflammation.     Electronically signed out by:     Teodora Mendez M.D.       Assessment/Plan:  Pt s/p excision of pilonidal cyst.   Wound torn open..   and smaller this week.  AGNO4 applied.  Will cont silvercel daily to wound.   F/U 2 weeks for wound check or sooner if problems develop.    Enoch Acosta MD, FACS

## 2018-05-07 NOTE — MR AVS SNAPSHOT
"              After Visit Summary   5/7/2018    Sanjay Burton    MRN: [id number]           Patient Information     Date Of Birth          2000        Visit Information        Provider Department      5/7/2018 4:00 PM Enoch Acosta MD [facility]        Today's Diagnoses     Post-operative state    -  1       Follow-ups after your visit        Your next 10 appointments already scheduled     May 07, 2018  4:00 PM CDT   Return Visit with Enoch Acosta MD   [facility]    [address] [place] [id number] [phone]              Who to contact     If you have questions or need follow up information about today's clinic visit or your schedule please contact [facility] directly at [phone].  Normal or non-critical lab and imaging results will be communicated to you by MyChart, letter or phone within 4 business days after the clinic has received the results. If you do not hear from us within 7 days, please contact the clinic through MyChart or phone. If you have a critical or abnormal lab result, we will notify you by phone as soon as possible.  Submit refill requests through [employer] or call your pharmacy and they will forward the refill request to us. Please allow 3 business days for your refill to be completed.          Additional Information About Your Visit        MyChart Information     [employer] lets you send messages to your doctor, view your test results, renew your prescriptions, schedule appointments and more. To sign up, go to [email or web address] . Click on \"Log in\" on the left side of the screen, which will take you to the Welcome page. Then click on \"Sign up Now\" on the right side of the page.     You will be asked to enter the access code listed below, as well as some personal information. Please follow the directions to create your username and password.     Your access code is: " [email or web address]  Expires: 2018 11:05 AM     Your access code will  in 90 days. If you need help or a new code, please call your [place] clinic or [phone].        Care EveryWhere ID     This is your Care EveryWhere ID. This could be used by other organizations to access your [place] medical records  [id number]        Your Vitals Were     Pulse Temperature Pulse Oximetry BMI (Body Mass Index)          119 98.8  F (37.1  C) (Temporal) 100% 27.14 kg/m2         Blood Pressure from Last 3 Encounters:   18 130/70   18 122/64   18 122/74    Weight from Last 3 Encounters:   18 81 kg (178 lb 8 oz) (80 %)*   18 80.7 kg (178 lb) (79 %)*   18 82.1 kg (181 lb) (82 %)*     * Growth percentiles are based on [facility] 2-20 Years data.              Today, you had the following     No orders found for display         Today's Medication Changes          These changes are accurate as of 18  3:14 PM.  If you have any questions, ask your nurse or doctor.               Stop taking these medicines if you haven't already. Please contact your care team if you have questions.     HYDROcodone-acetaminophen 5-325 MG per tablet   Commonly known as:  NORCO   Stopped by:  Enoch Acosta MD                    Primary Care Provider Office Phone # Fax #    Cari Lal [phone]       XX RESIGNED XX  [facility] [address]        Equal Access to Services     RORY REED AH: Hadderik Todd, wakayyda lumadonnaadaha, qaybta kaalmayte mullins. So [facility] [phone].    ATENCIÓN: Si habla español, tiene a anne disposición servicios gratuitos de asistencia lingüística. Llame al [phone].    We comply with applicable federal civil rights laws and [place] laws. We do not discriminate on the basis of race, color, national origin, age, disability, sex, sexual orientation, or gender identity.            Thank you!     Thank you for  choosing [facility]  for your care. Our goal is always to provide you with excellent care. Hearing back from our patients is one way we can continue to improve our services. Please take a few minutes to complete the written survey that you may receive in the mail after your visit with us. Thank you!             Your Updated Medication List - Protect others around you: Learn how to safely use, store and throw away your medicines at www.disposemymeds.org.      Notice  As of 5/7/2018  3:14 PM    You have not been prescribed any medications.

## 2018-06-11 ENCOUNTER — OFFICE VISIT (OUTPATIENT)
Dept: SURGERY | Facility: OTHER | Age: 18
End: 2018-06-11
Payer: COMMERCIAL

## 2018-06-11 VITALS
SYSTOLIC BLOOD PRESSURE: 122 MMHG | DIASTOLIC BLOOD PRESSURE: 60 MMHG | BODY MASS INDEX: 27.83 KG/M2 | TEMPERATURE: 97.5 F | WEIGHT: 183 LBS

## 2018-06-11 DIAGNOSIS — Z98.890 POST-OPERATIVE STATE: Primary | ICD-10-CM

## 2018-06-11 PROCEDURE — 99024 POSTOP FOLLOW-UP VISIT: CPT | Performed by: SPECIALIST

## 2018-06-11 NOTE — MR AVS SNAPSHOT
After Visit Summary   6/11/2018    Sanjay Burton    MRN: [id number]           Patient Information     Date Of Birth          2000        Visit Information        Provider Department      6/11/2018 8:15 AM Enoch Acosta MD [facility]        Today's Diagnoses     Post-operative state    -  1       Follow-ups after your visit        Who to contact     If you have questions or need follow up information about today's clinic visit or your schedule please contact [facility] directly at [phone].  Normal or non-critical lab and imaging results will be communicated to you by MyChart, letter or phone within 4 business days after the clinic has received the results. If you do not hear from us within 7 days, please contact the clinic through MyChart or phone. If you have a critical or abnormal lab result, we will notify you by phone as soon as possible.  Submit refill requests through [employer] or call your pharmacy and they will forward the refill request to us. Please allow 3 business days for your refill to be completed.          Additional Information About Your Visit        Care EveryWhere ID     This is your Care EveryWhere ID. This could be used by other organizations to access your [place] medical records  [id number]        Your Vitals Were     Temperature BMI (Body Mass Index)                97.5  F (36.4  C) (Temporal) 27.83 kg/m2           Blood Pressure from Last 3 Encounters:   06/11/18 122/60   04/23/18 130/70   04/09/18 122/64    Weight from Last 3 Encounters:   06/11/18 83 kg (183 lb) (83 %)*   05/07/18 81 kg (178 lb 8 oz) (80 %)*   04/23/18 80.7 kg (178 lb) (79 %)*     * Growth percentiles are based on CDC 2-20 Years data.              Today, you had the following     No orders found for display       Primary Care Provider Office Phone # Fax #    Cari Lal [phone]       XX RESIGNED XX  [facility] [address]        Equal  Access to Services     [facility]: Hadii aad ku hadabisaisavanah Todd, wakayyaltagracia samaniego, britneychacho calixtomamayte rod. So [facility] [phone].    ATENCIÓN: Si habla español, tiene a anne disposición servicios gratuitos de asistencia lingüística. Llame al [phone].    We comply with applicable federal civil rights laws and [place] laws. We do not discriminate on the basis of race, color, national origin, age, disability, sex, sexual orientation, or gender identity.            Thank you!     Thank you for choosing [facility]  for your care. Our goal is always to provide you with excellent care. Hearing back from our patients is one way we can continue to improve our services. Please take a few minutes to complete the written survey that you may receive in the mail after your visit with us. Thank you!             Your Updated Medication List - Protect others around you: Learn how to safely use, store and throw away your medicines at www.disposemymeds.org.      Notice  As of 6/11/2018  8:52 AM    You have not been prescribed any medications.

## 2018-06-11 NOTE — PROGRESS NOTES
F/U for pilonidal cyst excision    Subjective:  Pt feels good. Wound was closed 1 month ago.  Was doing a lot of travelling and sitting.  Sat to have a bm and felt something run down 1 week ago.  Became concerned.          Objective:  B/P: 122/60, T: 97.5, P: Data Unavailable, R: Data Unavailable  Back: Excision site clean.  Appears to have recently injured area - now healed.      Path -   FINAL DIAGNOSIS:   Skin ellipse with attached portion of subcutaneous fat, excision:   - Pilonidal cyst with fibrosis and chronic inflammation.     Electronically signed out by:     Teodora Mendez M.D.       Assessment/Plan:  Pt s/p excision of pilonidal cyst.   Wound torn open last week.  - Now appears healed.  Will place gauze between cheeks x2 weeks to keep dry.  F/U PRN or if re-opens    Enoch Acosta MD, FACS

## 2018-06-11 NOTE — NURSING NOTE
"Sanjay Burton is a 17 year old male who presents for:  Chief Complaint   Patient presents with     RECHECK     pilondal cyst-feels it has reopened.        Initial Vitals:  /60 (BP Location: Right arm, Patient Position: Sitting, Cuff Size: Adult Large)  Temp 97.5  F (36.4  C) (Temporal)  Wt 83 kg (183 lb)  BMI 27.83 kg/m2 Estimated body mass index is 27.83 kg/(m^2) as calculated from the following:    Height as of 1/6/18: 1.727 m (5' 8\").    Weight as of this encounter: 83 kg (183 lb).. Body surface area is 2 meters squared. BP completed using cuff size: large  Data Unavailable    Do you feel safe in your environment?  Yes  Do you need any refills today? No    Nursing Comments:         Aranza Mcwilliams  "

## 2018-06-11 NOTE — LETTER
6/11/2018         RE: Sanjay Burton  [address] [facility] [address]        Dear Colleague,    Thank you for referring your patient, Sanjay Burton, to the [facility]. Please see a copy of my visit note below.    F/U for pilonidal cyst excision    Subjective:  Pt feels good. Wound was closed 1 month ago.  Was doing a lot of travelling and sitting.  Sat to have a bm and felt something run down 1 week ago.  Became concerned.          Objective:  B/P: 122/60, T: 97.5, P: Data Unavailable, R: Data Unavailable  Back: Excision site clean.  Appears to have recently injured area - now healed.      Path -   FINAL DIAGNOSIS:   Skin ellipse with attached portion of subcutaneous fat, excision:   - Pilonidal cyst with fibrosis and chronic inflammation.     Electronically signed out by:     Teodora Mendez M.D.       Assessment/Plan:  Pt s/p excision of pilonidal cyst.   Wound torn open last week.  - Now appears healed.  Will place gauze between cheeks x2 weeks to keep dry.  F/U PRN or if re-opens    Enoch Acosta MD, FACS        Again, thank you for allowing me to participate in the care of your patient.        Sincerely,        Enoch Acosta MD

## 2018-07-30 ENCOUNTER — OFFICE VISIT (OUTPATIENT)
Dept: SURGERY | Facility: CLINIC | Age: 18
End: 2018-07-30
Payer: COMMERCIAL

## 2018-07-30 VITALS
HEIGHT: 68 IN | OXYGEN SATURATION: 100 % | BODY MASS INDEX: 27.38 KG/M2 | TEMPERATURE: 98.9 F | DIASTOLIC BLOOD PRESSURE: 74 MMHG | SYSTOLIC BLOOD PRESSURE: 120 MMHG | HEART RATE: 96 BPM | WEIGHT: 180.7 LBS

## 2018-07-30 DIAGNOSIS — Z98.890 POST-OPERATIVE STATE: Primary | ICD-10-CM

## 2018-07-30 PROCEDURE — 17250 CHEM CAUT OF GRANLTJ TISSUE: CPT | Performed by: SPECIALIST

## 2018-07-30 PROCEDURE — 99212 OFFICE O/P EST SF 10 MIN: CPT | Mod: 25 | Performed by: SPECIALIST

## 2018-07-30 ASSESSMENT — PAIN SCALES - GENERAL: PAINLEVEL: NO PAIN (0)

## 2018-07-30 NOTE — MR AVS SNAPSHOT
"              After Visit Summary   7/30/2018    Sanjay Burton    MRN: [id number]           Patient Information     Date Of Birth          2000        Visit Information        Provider Department      7/30/2018 4:00 PM Enoch Acosta MD [facility]        Today's Diagnoses     Post-operative state    -  1       Follow-ups after your visit        Your next 10 appointments already scheduled     Aug 09, 2018  8:15 AM CDT   [facility] Visit with Enoch Acosta MD   [facility]    [address] [place] [id number] [phone]              Who to contact     If you have questions or need follow up information about today's clinic visit or your schedule please contact [facility] directly at [phone].  Normal or non-critical lab and imaging results will be communicated to you by MyChart, letter or phone within 4 business days after the clinic has received the results. If you do not hear from us within 7 days, please contact the clinic through MyChart or phone. If you have a critical or abnormal lab result, we will notify you by phone as soon as possible.  Submit refill requests through [employer] or call your pharmacy and they will forward the refill request to us. Please allow 3 business days for your refill to be completed.          Additional Information About Your Visit        Care EveryWhere ID     This is your Care EveryWhere ID. This could be used by other organizations to access your [place] medical records  [id number]        Your Vitals Were     Pulse Temperature Height Pulse Oximetry BMI (Body Mass Index)       96 98.9  F (37.2  C) (Temporal) 1.727 m (5' 8\") 100% 27.48 kg/m2        Blood Pressure from Last 3 Encounters:   07/30/18 120/74   06/11/18 122/60   04/23/18 130/70    Weight from Last 3 Encounters:   07/30/18 82 kg (180 lb 11.2 oz) (81 %)*   06/11/18 83 kg (183 lb) (83 %)*   05/07/18 81 kg (178 lb 8 oz) " (80 %)*     * Growth percentiles are based on [facility] 2-20 Years data.              Today, you had the following     No orders found for display       Primary Care Provider Office Phone # Fax #    Cari Lal [phone]       XX RESIGNED XX  [facility] [address]        Equal Access to Services     [facility] BRIANNA : Hadii aad ku hadasho Soomaali, waaxda luqadaha, qaybta kaalmada adeegyada, waxay idiin hayaan adeeg annewestminerva lakevin . So [facility] [phone].    ATENCIÓN: Si habla español, tiene a anne disposición servicios gratuitos de asistencia lingüística. Llame al [phone].    We comply with applicable federal civil rights laws and [place] laws. We do not discriminate on the basis of race, color, national origin, age, disability, sex, sexual orientation, or gender identity.            Thank you!     Thank you for choosing [facility]  for your care. Our goal is always to provide you with excellent care. Hearing back from our patients is one way we can continue to improve our services. Please take a few minutes to complete the written survey that you may receive in the mail after your visit with us. Thank you!             Your Updated Medication List - Protect others around you: Learn how to safely use, store and throw away your medicines at www.disposemymeds.org.      Notice  As of 7/30/2018  4:27 PM    You have not been prescribed any medications.

## 2018-07-30 NOTE — LETTER
7/30/2018         RE: Sanjay Butron  [address]   [place] [address]        Dear Colleague,    Thank you for referring your patient, Sanjay Burton, to the [facility]. Please see a copy of my visit note below.    F/U for pilonidal cyst excision    Subjective:  Pt feels good. Wound was closed 1.5 month ago.  Was doing a lot of travelling and sitting.   It re-opened again 2 weeks ago.        Objective:  B/P: 120/74, T: 98.9, P: 96, R: Data Unavailable  Back: Excision site clean.  2x1x1 cm hyper granular area - agno4 applied      Path -   FINAL DIAGNOSIS:   Skin ellipse with attached portion of subcutaneous fat, excision:   - Pilonidal cyst with fibrosis and chronic inflammation.     Electronically signed out by:     Teodora Mendez M.D.       Assessment/Plan:  Pt s/p excision of pilonidal cyst.   Wound reopened 2 weeks ago.  Will cont resume silvercel.    Very hypergranular - agno4 applied.  Will restart silvercel.  If no improvement - consider -re-excision and primary closure of the area.    Enoch Acosta MD, FACS        Again, thank you for allowing me to participate in the care of your patient.        Sincerely,        Enoch Acosta MD

## 2018-07-30 NOTE — PROGRESS NOTES
F/U for pilonidal cyst excision    Subjective:  Pt feels good. Wound was closed 1.5 month ago.  Was doing a lot of travelling and sitting.   It re-opened again 2 weeks ago.        Objective:  B/P: 120/74, T: 98.9, P: 96, R: Data Unavailable  Back: Excision site clean.  2x1x1 cm hyper granular area - agno4 applied      Path -   FINAL DIAGNOSIS:   Skin ellipse with attached portion of subcutaneous fat, excision:   - Pilonidal cyst with fibrosis and chronic inflammation.     Electronically signed out by:     Teodora Mendez M.D.       Assessment/Plan:  Pt s/p excision of pilonidal cyst.   Wound reopened 2 weeks ago.  Will cont resume silvercel.    Very hypergranular - agno4 applied.  Will restart silvercel.  If no improvement - consider -re-excision and primary closure of the area.    Enoch Acosta MD, FACS

## 2018-08-09 ENCOUNTER — OFFICE VISIT (OUTPATIENT)
Dept: SURGERY | Facility: CLINIC | Age: 18
End: 2018-08-09
Payer: COMMERCIAL

## 2018-08-09 VITALS — WEIGHT: 181.4 LBS | BODY MASS INDEX: 27.58 KG/M2 | DIASTOLIC BLOOD PRESSURE: 70 MMHG | SYSTOLIC BLOOD PRESSURE: 130 MMHG

## 2018-08-09 DIAGNOSIS — Z98.890 POST-OPERATIVE STATE: Primary | ICD-10-CM

## 2018-08-09 PROCEDURE — 99212 OFFICE O/P EST SF 10 MIN: CPT | Performed by: SPECIALIST

## 2018-08-09 NOTE — NURSING NOTE
"Sanjay Burton is a 17 year old male who presents for:  Chief Complaint   Patient presents with     RECHECK     pilondal cyst        Initial Vitals:  /70 (BP Location: Right arm, Patient Position: Sitting, Cuff Size: Adult Large)  Wt 82.3 kg (181 lb 6.4 oz)  BMI 27.58 kg/m2 Estimated body mass index is 27.58 kg/(m^2) as calculated from the following:    Height as of 7/30/18: 1.727 m (5' 8\").    Weight as of this encounter: 82.3 kg (181 lb 6.4 oz).. Body surface area is 1.99 meters squared. BP completed using cuff size: regular  Data Unavailable    Do you feel safe in your environment?  Yes  Do you need any refills today? No    Nursing Comments:         Aranza Mcwilliams  "

## 2018-08-09 NOTE — LETTER
8/9/2018         RE: Sanjay Burton  [address]  [place] [address]        Dear Colleague,    Thank you for referring your patient, Sanjay Burton, to the [facility]. Please see a copy of my visit note below.    F/U for pilonidal cyst excision    Subjective:  Pt feels good. Wound was closed 1.5 month ago.  Was doing a lot of travelling and sitting.   It re-opened again.  Was see 1 week ago and tx wih AGNO4      Objective:  B/P: 120/74, T: 98.9, P: 96, R: Data Unavailable  Back: Excision site clean.  1x0.3 cm  agno4 applied      Path -   FINAL DIAGNOSIS:   Skin ellipse with attached portion of subcutaneous fat, excision:   - Pilonidal cyst with fibrosis and chronic inflammation.     Electronically signed out by:     Teodora Mendez M.D.       Assessment/Plan:  Pt s/p excision of pilonidal cyst.   Wound reopened 2 weeks ago.  Much improved today - almost closed.  Will cont cont  silvercel.    Agno4 applied.  Will restart silvercel.  F/U 2 weeks if remains open.      Enoch Acosta MD, FACS        Again, thank you for allowing me to participate in the care of your patient.        Sincerely,        Enoch Acosta MD

## 2018-08-09 NOTE — PROGRESS NOTES
F/U for pilonidal cyst excision    Subjective:  Pt feels good. Wound was closed 1.5 month ago.  Was doing a lot of travelling and sitting.   It re-opened again.  Was see 1 week ago and tx wih AGNO4      Objective:  B/P: 120/74, T: 98.9, P: 96, R: Data Unavailable  Back: Excision site clean.  1x0.3 cm  agno4 applied      Path -   FINAL DIAGNOSIS:   Skin ellipse with attached portion of subcutaneous fat, excision:   - Pilonidal cyst with fibrosis and chronic inflammation.     Electronically signed out by:     Teodora Mendez M.D.       Assessment/Plan:  Pt s/p excision of pilonidal cyst.   Wound reopened 2 weeks ago.  Much improved today - almost closed.  Will cont cont  silvercel.    Agno4 applied.  Will restart silvercel.  F/U 2 weeks if remains open.      Enoch Acosta MD, FACS

## 2018-08-09 NOTE — MR AVS SNAPSHOT
After Visit Summary   8/9/2018    Sanjay Burton    MRN: [id number]           Patient Information     Date Of Birth          2000        Visit Information        Provider Department      8/9/2018 8:15 AM Enoch Acosta MD [facility]        Today's Diagnoses     Post-operative state    -  1       Follow-ups after your visit        Who to contact     If you have questions or need follow up information about today's clinic visit or your schedule please contact [facility] directly at [phone].  Normal or non-critical lab and imaging results will be communicated to you by MyChart, letter or phone within 4 business days after the clinic has received the results. If you do not hear from us within 7 days, please contact the clinic through MyChart or phone. If you have a critical or abnormal lab result, we will notify you by phone as soon as possible.  Submit refill requests through InEdge or call your pharmacy and they will forward the refill request to us. Please allow 3 business days for your refill to be completed.          Additional Information About Your Visit        Care EveryWhere ID     This is your Care EveryWhere ID. This could be used by other organizations to access your [place] medical records  [id number]        Your Vitals Were     BMI (Body Mass Index)                   27.58 kg/m2            Blood Pressure from Last 3 Encounters:   08/09/18 130/70   07/30/18 120/74   06/11/18 122/60    Weight from Last 3 Encounters:   08/09/18 82.3 kg (181 lb 6.4 oz) (81 %)*   07/30/18 82 kg (180 lb 11.2 oz) (81 %)*   06/11/18 83 kg (183 lb) (83 %)*     * Growth percentiles are based on CDC 2-20 Years data.              Today, you had the following     No orders found for display       Primary Care Provider Office Phone # Fax #    Cari Lal [phone]       XX RESIGNED XX  [facility] [address]        Equal Access to Services     TIM  GAAR : Hadii aad ku hadgo Todd, wakayyda luqadaha, qaybta kadena robertkinseyaltagracia, mayte rodríguezwestminerva hunter. So [facility] [phone].    ATENCIÓN: Si habla español, tiene a anne disposición servicios gratuitos de asistencia lingüística. Llame al [phone].    We comply with applicable federal civil rights laws and [place] laws. We do not discriminate on the basis of race, color, national origin, age, disability, sex, sexual orientation, or gender identity.            Thank you!     Thank you for choosing [facility]  for your care. Our goal is always to provide you with excellent care. Hearing back from our patients is one way we can continue to improve our services. Please take a few minutes to complete the written survey that you may receive in the mail after your visit with us. Thank you!             Your Updated Medication List - Protect others around you: Learn how to safely use, store and throw away your medicines at www.disposemymeds.org.      Notice  As of 8/9/2018  8:29 AM    You have not been prescribed any medications.

## 2018-09-06 ENCOUNTER — OFFICE VISIT (OUTPATIENT)
Dept: SURGERY | Facility: CLINIC | Age: 18
End: 2018-09-06
Payer: COMMERCIAL

## 2018-09-06 VITALS
WEIGHT: 178.5 LBS | DIASTOLIC BLOOD PRESSURE: 81 MMHG | HEART RATE: 100 BPM | SYSTOLIC BLOOD PRESSURE: 124 MMHG | OXYGEN SATURATION: 100 % | BODY MASS INDEX: 27.14 KG/M2

## 2018-09-06 DIAGNOSIS — Z98.890 POST-OPERATIVE STATE: Primary | ICD-10-CM

## 2018-09-06 PROCEDURE — 99212 OFFICE O/P EST SF 10 MIN: CPT | Performed by: SPECIALIST

## 2018-09-06 NOTE — LETTER
9/6/2018         RE: Sanjay Burton  [address] [facility] [address]        Dear Colleague,    Thank you for referring your patient, Sanjay Burton, to the [facility]. Please see a copy of my visit note below.    F/U for pilonidal cyst excision    Subjective:  Pt feels good. Wound was closed 1.5 month ago.  Was doing a lot of travelling and sitting.   It re-opened again.   Was last seen 1 month ago.  Wiped last night and thought he saw pus - now follows up.    Objective:  B/P: 124/81, T: Data Unavailable, P: 100, R: Data Unavailable  Back: Excision site clean.  Appears healed.  Some fragile skin but closed.    Path -   FINAL DIAGNOSIS:   Skin ellipse with attached portion of subcutaneous fat, excision:   - Pilonidal cyst with fibrosis and chronic inflammation.     Electronically signed out by:     Teodora Mendez M.D.       Assessment/Plan:  Pt s/p excision of pilonidal cyst.   Wound reopened appears closed today.  Will cont gauze between cheeks for 2 weeks.  F/U 2 weeks if appears to be draining.      Will consider re-excision vs bone scan if intermittent drainage.    Enoch Acosta MD, FACS        Again, thank you for allowing me to participate in the care of your patient.        Sincerely,        Enoch Acosta MD

## 2018-09-06 NOTE — PROGRESS NOTES
F/U for pilonidal cyst excision    Subjective:  Pt feels good. Wound was closed 1.5 month ago.  Was doing a lot of travelling and sitting.   It re-opened again.   Was last seen 1 month ago.  Wiped last night and thought he saw pus - now follows up.    Objective:  B/P: 124/81, T: Data Unavailable, P: 100, R: Data Unavailable  Back: Excision site clean.  Appears healed.  Some fragile skin but closed.    Path -   FINAL DIAGNOSIS:   Skin ellipse with attached portion of subcutaneous fat, excision:   - Pilonidal cyst with fibrosis and chronic inflammation.     Electronically signed out by:     Teodora Mendez M.D.       Assessment/Plan:  Pt s/p excision of pilonidal cyst.   Wound reopened appears closed today.  Will cont gauze between cheeks for 2 weeks.  F/U 2 weeks if appears to be draining.      Will consider re-excision vs bone scan if intermittent drainage.    Enoch Acosta MD, FACS

## 2018-09-06 NOTE — MR AVS SNAPSHOT
After Visit Summary   9/6/2018    Sanjay Burton    MRN: [id number]           Patient Information     Date Of Birth          2000        Visit Information        Provider Department      9/6/2018 9:15 AM Enoch Acosta MD [facility]        Today's Diagnoses     Post-operative state    -  1       Follow-ups after your visit        Who to contact     If you have questions or need follow up information about today's clinic visit or your schedule please contact [facility] directly at [phone].  Normal or non-critical lab and imaging results will be communicated to you by MyChart, letter or phone within 4 business days after the clinic has received the results. If you do not hear from us within 7 days, please contact the clinic through MyChart or phone. If you have a critical or abnormal lab result, we will notify you by phone as soon as possible.  Submit refill requests through Hypecal or call your pharmacy and they will forward the refill request to us. Please allow 3 business days for your refill to be completed.          Additional Information About Your Visit        Care EveryWhere ID     This is your Care EveryWhere ID. This could be used by other organizations to access your [place] medical records  [id number]        Your Vitals Were     Pulse Pulse Oximetry BMI (Body Mass Index)             100 100% 27.14 kg/m2          Blood Pressure from Last 3 Encounters:   09/06/18 124/81   08/09/18 130/70   07/30/18 120/74    Weight from Last 3 Encounters:   09/06/18 81 kg (178 lb 8 oz)   08/09/18 82.3 kg (181 lb 6.4 oz) (81 %)*   07/30/18 82 kg (180 lb 11.2 oz) (81 %)*     * Growth percentiles are based on CDC 2-20 Years data.              Today, you had the following     No orders found for display       Primary Care Provider Office Phone # Fax #    Cari Lal [phone]       XX RESIGNED XX  [facility] [address]        Equal Access to  Services     [facility]: Hadii ulises Todd, wakayyda lucíaadaha, qaybchacho kaalkate kohli, mayte hunter. So [facility] [phone].    ATENCIÓN: Si habla español, tiene a anne disposición servicios gratuitos de asistencia lingüística. Llame al [phone].    We comply with applicable federal civil rights laws and [place] laws. We do not discriminate on the basis of race, color, national origin, age, disability, sex, sexual orientation, or gender identity.            Thank you!     Thank you for choosing [facility]  for your care. Our goal is always to provide you with excellent care. Hearing back from our patients is one way we can continue to improve our services. Please take a few minutes to complete the written survey that you may receive in the mail after your visit with us. Thank you!             Your Updated Medication List - Protect others around you: Learn how to safely use, store and throw away your medicines at www.disposemymeds.org.      Notice  As of 9/6/2018  9:39 AM    You have not been prescribed any medications.

## 2018-09-27 ENCOUNTER — OFFICE VISIT (OUTPATIENT)
Dept: SURGERY | Facility: CLINIC | Age: 18
End: 2018-09-27
Payer: COMMERCIAL

## 2018-09-27 VITALS
SYSTOLIC BLOOD PRESSURE: 130 MMHG | TEMPERATURE: 97.3 F | DIASTOLIC BLOOD PRESSURE: 60 MMHG | HEART RATE: 93 BPM | BODY MASS INDEX: 27.26 KG/M2 | WEIGHT: 179.3 LBS | OXYGEN SATURATION: 99 %

## 2018-09-27 DIAGNOSIS — Z98.890 POST-OPERATIVE STATE: Primary | ICD-10-CM

## 2018-09-27 PROCEDURE — 99212 OFFICE O/P EST SF 10 MIN: CPT | Performed by: SPECIALIST

## 2018-09-27 NOTE — NURSING NOTE
"Sanjay Burton is a 18 year old male who presents for:  Chief Complaint   Patient presents with     RECHECK     pilondal cyst        Initial Vitals:  /60 (BP Location: Right arm, Patient Position: Sitting, Cuff Size: Adult Large)  Pulse 93  Temp 97.3  F (36.3  C) (Temporal)  Wt 81.3 kg (179 lb 4.8 oz)  SpO2 99%  BMI 27.26 kg/m2 Estimated body mass index is 27.26 kg/(m^2) as calculated from the following:    Height as of 7/30/18: 1.727 m (5' 8\").    Weight as of this encounter: 81.3 kg (179 lb 4.8 oz).. Body surface area is 1.97 meters squared. BP completed using cuff size: large  Data Unavailable    Do you feel safe in your environment?  Yes  Do you need any refills today? No    Nursing Comments:         Aranza Mcwilliams  "

## 2018-09-27 NOTE — PROGRESS NOTES
F/U for pilonidal cyst excision    Subjective:  Pt feels good. Wound was closed 1.5 month ago.  Was doing a lot of travelling and sitting.   It re-opened again.   Was last seen 1 month ago.  Wound appears closed last visit.  No complaints.  Just wants it checked.      Objective:  B/P: 124/81, T: Data Unavailable, P: 100, R: Data Unavailable  Back: Excision site clean.  Appears healed. Closed and with good epithelialization    Path -   FINAL DIAGNOSIS:   Skin ellipse with attached portion of subcutaneous fat, excision:   - Pilonidal cyst with fibrosis and chronic inflammation.     Electronically signed out by:     Teodora Mendez M.D.       Assessment/Plan:  Pt s/p excision of pilonidal cyst.   Wound reopened appears closed today.  F/U PRN.    Will consider re-excision vs bone scan if intermittent drainage.    Enohc Acosta MD, FACS

## 2018-09-27 NOTE — MR AVS SNAPSHOT
"              After Visit Summary   2018    Sanjay Burton    MRN: [id number]           Patient Information     Date Of Birth          2000        Visit Information        Provider Department      2018 7:30 AM Enoch Acosta MD [facility]        Today's Diagnoses     Post-operative state    -  1       Follow-ups after your visit        Who to contact     If you have questions or need follow up information about today's clinic visit or your schedule please contact [facility] directly at [phone].  Normal or non-critical lab and imaging results will be communicated to you by MyChart, letter or phone within 4 business days after the clinic has received the results. If you do not hear from us within 7 days, please contact the clinic through [employer] or phone. If you have a critical or abnormal lab result, we will notify you by phone as soon as possible.  Submit refill requests through [employer]oc[employer] or call your pharmacy and they will forward the refill request to us. Please allow 3 business days for your refill to be completed.          Additional Information About Your Visit        MyChart Information     [employer]oc[employer] lets you send messages to your doctor, view your test results, renew your prescriptions, schedule appointments and more. To sign up, go to [email or web address] . Click on \"Log in\" on the left side of the screen, which will take you to the Welcome page. Then click on \"Sign up Now\" on the right side of the page.     You will be asked to enter the access code listed below, as well as some personal information. Please follow the directions to create your username and password.     Your access code is: [email or web address]  Expires: 2018  7:59 AM     Your access code will  in 90 days. If you need help or a new code, please call your [facility] or [phone].        Care EveryWhere ID     This is your Care EveryWhere ID. This could be used by other " organizations to access your [place] medical records  [id number]        Your Vitals Were     Pulse Temperature Pulse Oximetry BMI (Body Mass Index)          93 97.3  F (36.3  C) (Temporal) 99% 27.26 kg/m2         Blood Pressure from Last 3 Encounters:   09/27/18 130/60   09/06/18 124/81   08/09/18 130/70    Weight from Last 3 Encounters:   09/27/18 81.3 kg (179 lb 4.8 oz)   09/06/18 81 kg (178 lb 8 oz)   08/09/18 82.3 kg (181 lb 6.4 oz) (81 %)*     * Growth percentiles are based on [facility] 2-20 Years data.              Today, you had the following     No orders found for display       Primary Care Provider Office Phone # Fax #    Cari Lal [phone]       XX RESIGNED XX  [facility] [address]        Equal Access to Services     RORY REED : Hadii ulises dugano Sobrianna, waaxda luqadaha, qaybta kaalmada adeisaurayada, mayte finley . So [facility] [phone].    ATENCIÓN: Si habla español, tiene a anne disposición servicios gratuitos de asistencia lingüística. Llame al [phone].    We comply with applicable federal civil rights laws and [place] laws. We do not discriminate on the basis of race, color, national origin, age, disability, sex, sexual orientation, or gender identity.            Thank you!     Thank you for choosing [facility]  for your care. Our goal is always to provide you with excellent care. Hearing back from our patients is one way we can continue to improve our services. Please take a few minutes to complete the written survey that you may receive in the mail after your visit with us. Thank you!             Your Updated Medication List - Protect others around you: Learn how to safely use, store and throw away your medicines at www.disposemymeds.org.      Notice  As of 9/27/2018  7:59 AM    You have not been prescribed any medications.

## 2018-09-27 NOTE — LETTER
9/27/2018         RE: Sanjay Burton  [address] [facility] [address]        Dear Colleague,    Thank you for referring your patient, Sanjay Burton, to the [facility]. Please see a copy of my visit note below.    F/U for pilonidal cyst excision    Subjective:  Pt feels good. Wound was closed 1.5 month ago.  Was doing a lot of travelling and sitting.   It re-opened again.   Was last seen 1 month ago.  Wound appears closed last visit.  No complaints.  Just wants it checked.      Objective:  B/P: 124/81, T: Data Unavailable, P: 100, R: Data Unavailable  Back: Excision site clean.  Appears healed. Closed and with good epithelialization    Path -   FINAL DIAGNOSIS:   Skin ellipse with attached portion of subcutaneous fat, excision:   - Pilonidal cyst with fibrosis and chronic inflammation.     Electronically signed out by:     Teodora Mendez M.D.       Assessment/Plan:  Pt s/p excision of pilonidal cyst.   Wound reopened appears closed today.  F/U PRN.    Will consider re-excision vs bone scan if intermittent drainage.    Enoch Acosta MD, FACS        Again, thank you for allowing me to participate in the care of your patient.        Sincerely,        Enoch Acosta MD

## 2018-10-25 ENCOUNTER — HOSPITAL ENCOUNTER (OUTPATIENT)
Dept: WOUND CARE | Facility: CLINIC | Age: 18
Discharge: HOME | End: 2018-10-25
Admitting: SPECIALIST
Payer: COMMERCIAL

## 2018-10-25 PROCEDURE — G0463 HOSPITAL OUTPT CLINIC VISIT: HCPCS

## 2018-10-25 NOTE — IP AVS SNAPSHOT
"                  MRN:[id number]                      After Visit Summary   10/25/2018    aSnjay Burton    MRN: [id number]           Visit Information        Provider Department      10/25/2018  9:00 AM PH WOUND EXAM ROOM [facility]           Review of your medicines      Notice     You have not been prescribed any medications.             Protect others around you: Learn how to safely use, store and throw away your medicines at www.disposemymeds.org.         Follow-ups after your visit        Your next 10 appointments already scheduled     2018  7:45 AM CDT   Return Visit with Enoch Acosta MD   [facility] ([facility])    [address] [place] [id number] [phone]               Care Instructions        Further instructions from your care team       Today the area on your tail bone and lower back is all intact and there are no open wound and no visible drainage.    Due to the reoccurring nature of this and Dr Acosta's notes on possible re opening of the site I would recommend you keep your appointment with him next week and update him on recent drainage issue.    Thanks for coming in today.    Romeo Quijano RN cwocn     Additional Information About Your Visit        MyChart Information     [employer] lets you send messages to your doctor, view your test results, renew your prescriptions, schedule appointments and more. To sign up, go to [email or web address] . Click on \"Log in\" on the left side of the screen, which will take you to the Welcome page. Then click on \"Sign up Now\" on the right side of the page.     You will be asked to enter the access code listed below, as well as some personal information. Please follow the directions to create your username and password.     Your access code is: [email or web address]  Expires: 2018  7:59 AM     Your access code will  in 90 days. If you need help or a new code, please call your " [facility] or [phone].        Care EveryWhere ID     This is your Care EveryWhere ID. This could be used by other organizations to access your [facility] medical records  [id number]         Primary Care Provider Office Phone # Fax #    Cari Lal [phone]      Equal Access to Services     [facility]: Hadii aad ku hadasho Soomaali, waaxda luqadaha, qaybta kaalmada adeegyada, waxay gee hayaan adeeg annewestminerva lakevin . So [facility] [phone].    ATENCIÓN: Si habla español, tiene a anne disposición servicios gratuitos de asistencia lingüística. Llame al [phone].    We comply with applicable federal civil rights laws and [place] laws. We do not discriminate on the basis of race, color, national origin, age, disability, sex, sexual orientation, or gender identity.            Thank you!     Thank you for choosing [facility] for your care. Our goal is always to provide you with excellent care. Hearing back from our patients is one way we can continue to improve our services. Please take a few minutes to complete the written survey that you may receive in the mail after you visit with us. Thank you!             Medication List: This is a list of all your medications and when to take them. Check marks below indicate your daily home schedule. Keep this list as a reference.      Notice     You have not been prescribed any medications.

## 2018-10-25 NOTE — IP AVS SNAPSHOT
[address] [address]    Phone:  [phone]    Fax:  [phone]                                       After Visit Summary   10/25/2018    Sanjay Burton    MRN: [id number]           After Visit Summary Signature Page     I have received my discharge instructions, and my questions have been answered. I have discussed any challenges I see with this plan with the nurse or doctor.    ..........................................................................................................................................  Patient/Patient Representative Signature      ..........................................................................................................................................  Patient Representative Print Name and Relationship to Patient    ..................................................               ................................................  Date                                   Time    ..........................................................................................................................................  Reviewed by Signature/Title    ...................................................              ..............................................  Date                                               Time          22EPIC Rev 08/18

## 2018-10-25 NOTE — PROGRESS NOTES
Reason For Visit: Sanjay Burton, 18 year old male, seen as outpatient to evaluate and treat reoccurring drainage from a post surgical pilonidal cyst. Referred by Dr Dave HUANG MD. Patient presents by himself today.      History: Pt had surgery of a pilonidal cyst by Dr Acosta on 1/3/18.  The site had reoccurring periods of drainage since then.  Last seen by Dr Acosta on 9/27/18 and site was closed at that time.  Pt reports he noted thick yellowish drainage in small amount draining down his leg and felt small amount of additional moisture closer to the wound site on Friday of last week 10/19/18.  He also has had small amount of irritation/discomfort in the area this past week.  The drainage has persisted over the the past week but has not increased in volume or frequency.  Pt tried to schedule with Dr Acosta for this week but he did not have availability to see so pt was scheduled with the wound and ostomy clinic to evaluate in the interim.      Personal/social history: Pt works and is school, active and overall healthy, lives in the [place] area independently with family.    Objective:   Physical appearance: alert and oriented and in good spirits  Ambulation: independent with no assistive devices  Current treatment plan: just washing area daily no bandaging in use.  Last changed: NA    Wound #1 Lower Sacrum and Coccyx.    Stage/tissue depth: was a full thickness wound prior to filling in and scaring over.    0 cm L x 0 cm W x 0 cm D  Tunneling: none  Undermining: none  Wound bed type/amount: scar, no open aspects noted; NA fluctuant  Wound Edges: NA  Periwound: wnl  Drainage: none noted today see Assessment for greater details.  Odor: no  Pain: no    Dorsalis Pedal Pulse: Not assessed this visit, palpable: NA doppler: NA phasic  Hair growth: NA  Capillary Refill: NA seconds  Feet/toes color: NA  Nails: NA  R Leg: Edema NA. Ankle circumference NA cm. Calf circumference NA cm.  L Leg: Edema NA. Ankle  circumference NA cm. Calf circumference NA cm.    Mobility: full  Current offloading/footwear: regular shoes  Sensation: wnl  HgbA1C: NA Date: NA as the pt does not have diagnosis of diabetes  Checks Blood Glucose?:  NA Average Readings: NA  Other callousing/areas of concern: NA    Diet: Regular  Smoking: no    Discussed: etiology of wound (pilonidal cyst), pathophysiology and patient specific goals for wound healing.   Education: On wound status at this time, role of [facility] nurse in wound care recommendations and wound evaluations.  Need to keep f/u appointment with Dr Acosta next week as scheduled for re assessment and options for on going cares.    Assessment:  The scar on the lower sacrum and coccyx is intact with no open aspects noted today.  With palpation did not find any areas of concern, no increased warmth, no induration or bogginess, unable to feel any fluid within the area and not able to express any drainage.  Assessment of the anal area and surrounding skin of the buttocks was unremarkable.  All appears normal with no open wounds, no ingrown hairs, no scratches, no abrasions or bruises noted.      Barriers to wound healing:   Poor nutrition: inadequate supply of protein, carbohydrates, fatty acids, and trace elements essential for all phases of wound healing.  Discussed need for good amounts of protein in the diet for on going remodeling of the wound site.  Reduced Blood Supply: inadequate perfusion to heal wound, NA  Medication: NA  Chemotherapy: suppresses the immune system and inflammatory response, NA  Radiotherapy: increases production of free radical which damage cells, NA  Psychological stress: related to the reoccurring nature of the drainage  Obesity: decreases tissue perfusion, NA  Infection: prolongs inflammatory phase, uses vital nutrients, impairs epithelialization and releases toxins, none noted today  Underlying Disease: diabetes mellitis and autoimmune disorders, NA  Maceration: reduces  wound tensile strength and inhibits epithelial migration, None noted today, as level of drainage has been small in amounts risk is low.  Patient compliance, NA  Unrelieved pressure, NA  Immobility, NA  Substance abuse: NA    Plan:  At this time pt will just keep the area clean and dry with no bandaging in use.  He has appointment with Dr Dave HUANG MD next week and will plan to keep that date.  He will contact either the specialty clinic or the wound and ostomy clinic if new concerns arise before that appointment date and time.    Topical care: NA  Offloading: NA  Additional recommendations: Avoid trauma to the site and monitor pain, discomfort and any drainage for volume and characteristics.     Wound Care: Wound cleansed with Microklenz and gauze, patted dry. Periwound protected with NA. Wound base filled with NA. Covered with NA, followed by NA. Secured with NA. To be changed NA.    Discussed plan of care with patient. Teaching done with patient for dressing changes NA; NA is able and willing to perform.  Pt is to only cleanse and monitor the site, no wound cares a this time.      The following discharge instructions were reviewed with and sent home with the patient: See discharge instructions    The following supplies were sent home with the patient:  None  Will reassess care plan in, NA.  Pt to see Dr Acosta next week Thursday    Return visit: NA to the wound and ostomy clinic    Verbal, written, & demonstrative education provided.  Face to face time (excluding procedure): approximately 15 minutes.  Procedure: NA  Care plan was not changed.    [phone]

## 2018-10-25 NOTE — DISCHARGE INSTRUCTIONS
Today the area on your tail bone and lower back is all intact and there are no open wound and no visible drainage.    Due to the reoccurring nature of this and Dr Acosta's notes on possible re opening of the site I would recommend you keep your appointment with him next week and update him on recent drainage issue.    Thanks for coming in today.    Romeo Quijano RN cwocn

## 2018-11-01 ENCOUNTER — OFFICE VISIT (OUTPATIENT)
Dept: SURGERY | Facility: CLINIC | Age: 18
End: 2018-11-01
Payer: COMMERCIAL

## 2018-11-01 VITALS
OXYGEN SATURATION: 98 % | BODY MASS INDEX: 27.86 KG/M2 | WEIGHT: 183.2 LBS | DIASTOLIC BLOOD PRESSURE: 62 MMHG | SYSTOLIC BLOOD PRESSURE: 124 MMHG | HEART RATE: 87 BPM

## 2018-11-01 DIAGNOSIS — Z98.890 POST-OPERATIVE STATE: Primary | ICD-10-CM

## 2018-11-01 PROCEDURE — 99212 OFFICE O/P EST SF 10 MIN: CPT | Performed by: SPECIALIST

## 2018-11-01 NOTE — NURSING NOTE
"Sanjay Burton is a 18 year old male who presents for:  Chief Complaint   Patient presents with     RECHECK     WOUND CARE     F/U for pilonidal cyst excision        Initial Vitals:  /62 (BP Location: Right arm, Patient Position: Sitting, Cuff Size: Adult Regular)  Pulse 87  Wt 83.1 kg (183 lb 3.2 oz)  SpO2 98%  BMI 27.86 kg/m2 Estimated body mass index is 27.86 kg/(m^2) as calculated from the following:    Height as of 7/30/18: 1.727 m (5' 8\").    Weight as of this encounter: 83.1 kg (183 lb 3.2 oz).. Body surface area is 2 meters squared. BP completed using cuff size: large  Data Unavailable    Do you feel safe in your environment?  Yes  Do you need any refills today? No    Nursing Comments:         Aranza Mcwilliams  "

## 2018-11-01 NOTE — LETTER
11/1/2018         RE: Sanjay Burton  [address] [facility] [address]        Dear Colleague,    Thank you for referring your patient, Sanjay Burton, to the [facility]. Please see a copy of my visit note below.    F/U for pilonidal cyst excision    Subjective:  Pt feels good. Wound was closed 1.5 month ago.  Was doing a lot of travelling and sitting.   It re-opened again.   Was last seen 1 month ago.  Wound appears closed last visit.        Was voiding 2 Fridays ago and felt pus run down his leg and was concerned about wound.  Saw wound nurse last week and wound was closed.  Now follows up        Objective:  B/P: 124/81, T: Data Unavailable, P: 100, R: Data Unavailable  Back: Excision site clean.  Appears healed. Closed and with good epithelialization.  Right buttock are with healing ingrown hair - well away from incision      Path -   FINAL DIAGNOSIS:   Skin ellipse with attached portion of subcutaneous fat, excision:   - Pilonidal cyst with fibrosis and chronic inflammation.     Electronically signed out by:     Teodora Mendez M.D.       Assessment/Plan:  Pt s/p excision of pilonidal cyst.  Wound remains closed today.   Drainage from lateral ingrown hair.  Healing.  Pt reassured.  F/U PRN.    Will consider re-excision vs bone scan if intermittent drainage from wound.    Enoch Acosta MD, FACS        Again, thank you for allowing me to participate in the care of your patient.        Sincerely,        Enoch Acosta MD

## 2018-11-01 NOTE — PROGRESS NOTES
F/U for pilonidal cyst excision    Subjective:  Pt feels good. Wound was closed 1.5 month ago.  Was doing a lot of travelling and sitting.   It re-opened again.   Was last seen 1 month ago.  Wound appears closed last visit.        Was voiding 2 Fridays ago and felt pus run down his leg and was concerned about wound.  Saw wound nurse last week and wound was closed.  Now follows up        Objective:  B/P: 124/81, T: Data Unavailable, P: 100, R: Data Unavailable  Back: Excision site clean.  Appears healed. Closed and with good epithelialization.  Right buttock are with healing ingrown hair - well away from incision      Path -   FINAL DIAGNOSIS:   Skin ellipse with attached portion of subcutaneous fat, excision:   - Pilonidal cyst with fibrosis and chronic inflammation.     Electronically signed out by:     Teodora Mendez M.D.       Assessment/Plan:  Pt s/p excision of pilonidal cyst.  Wound remains closed today.   Drainage from lateral ingrown hair.  Healing.  Pt reassured.  F/U PRN.    Will consider re-excision vs bone scan if intermittent drainage from wound.    Enoch Acosta MD, FACS

## 2018-11-01 NOTE — MR AVS SNAPSHOT
After Visit Summary   11/1/2018    Sanjay Burton    MRN: [id number]           Patient Information     Date Of Birth          2000        Visit Information        Provider Department      11/1/2018 7:45 AM Enoch Acosta MD [facility]        Today's Diagnoses     Post-operative state    -  1       Follow-ups after your visit        Who to contact     If you have questions or need follow up information about today's clinic visit or your schedule please contact [facility] directly at [phone].  Normal or non-critical lab and imaging results will be communicated to you by MyChart, letter or phone within 4 business days after the clinic has received the results. If you do not hear from us within 7 days, please contact the clinic through MyChart or phone. If you have a critical or abnormal lab result, we will notify you by phone as soon as possible.  Submit refill requests through [employer] or call your pharmacy and they will forward the refill request to us. Please allow 3 business days for your refill to be completed.          Additional Information About Your Visit        Care EveryWhere ID     This is your Care EveryWhere ID. This could be used by other organizations to access your [place] medical records  [id number]        Your Vitals Were     Pulse Pulse Oximetry BMI (Body Mass Index)             87 98% 27.86 kg/m2          Blood Pressure from Last 3 Encounters:   11/01/18 124/62   09/27/18 130/60   09/06/18 124/81    Weight from Last 3 Encounters:   11/01/18 83.1 kg (183 lb 3.2 oz)   09/27/18 81.3 kg (179 lb 4.8 oz)   09/06/18 81 kg (178 lb 8 oz)              Today, you had the following     No orders found for display       Primary Care Provider Office Phone # Fax #    Cari Lal [phone]       XX RESIGNED XX  [facility] [address]        Equal Access to Services     TIM REED : mariposa Story  todd samaniego waxberry brianin hayaashradhda jonesisaura saezshraddha ah. So [facility] [phone].    ATENCIÓN: Si ludin fletcher, tiene a anen disposición servicios gratuitos de asistencia lingüística. Llame al [phone].    We comply with applicable federal civil rights laws and [place] laws. We do not discriminate on the basis of race, color, national origin, age, disability, sex, sexual orientation, or gender identity.            Thank you!     Thank you for choosing [facility]  for your care. Our goal is always to provide you with excellent care. Hearing back from our patients is one way we can continue to improve our services. Please take a few minutes to complete the written survey that you may receive in the mail after your visit with us. Thank you!             Your Updated Medication List - Protect others around you: Learn how to safely use, store and throw away your medicines at www.disposemymeds.org.      Notice  As of 11/1/2018  8:14 AM    You have not been prescribed any medications.

## 2019-08-21 NOTE — MR AVS SNAPSHOT
"              After Visit Summary   4/23/2018    Sanjay Burton    MRN: [id number]           Patient Information     Date Of Birth          2000        Visit Information        Provider Department      4/23/2018 8:00 AM Enoch Acosta MD [facility]         Follow-ups after your visit        Your next 10 appointments already scheduled     May 07, 2018  4:00 PM CDT   [facility]    [address] [place] [id number] [phone]              Who to contact     If you have questions or need follow up information about today's clinic visit or your schedule please contact [facility] directly at [phone].  Normal or non-critical lab and imaging results will be communicated to you by MyChart, letter or phone within 4 business days after the clinic has received the results. If you do not hear from us within 7 days, please contact the clinic through MyChart or phone. If you have a critical or abnormal lab result, we will notify you by phone as soon as possible.  Submit refill requests through [employer] or call your pharmacy and they will forward the refill request to us. Please allow 3 business days for your refill to be completed.          Additional Information About Your Visit        [employer] Information     [employer] lets you send messages to your doctor, view your test results, renew your prescriptions, schedule appointments and more. To sign up, go to [email or web address] . Click on \"Log in\" on the left side of the screen, which will take you to the Welcome page. Then click on \"Sign up Now\" on the right side of the page.     You will be asked to enter the access code listed below, as well as some personal information. Please follow the directions to create your username and password.     Your access code is: [email or web address]  Expires: 6/17/2018 11:05 AM     Your access code will "  in 90 days. If you need help or a new code, please call your [place] clinic or [phone].        Care EveryWhere ID     This is your Care EveryWhere ID. This could be used by other organizations to access your [place] medical records  [id number]        Your Vitals Were     Temperature BMI (Body Mass Index)                97.6  F (36.4  C) (Temporal) 27.06 kg/m2           Blood Pressure from Last 3 Encounters:   18 130/70   18 122/64   18 122/74    Weight from Last 3 Encounters:   18 80.7 kg (178 lb) (79 %)*   18 82.1 kg (181 lb) (82 %)*   18 79.8 kg (176 lb) (78 %)*     * Growth percentiles are based on [facility] 2-20 Years data.              Today, you had the following     No orders found for display       Primary Care Provider Office Phone # Fax #    Cari Lal [phone]       XX RESIGNED XX  [facility] [address]        Equal Access to Services     [facility]: Hadii ulises nicolas hadgo Sobrianna, waaxda lusrinivas, qaybta kaalmaaltagracia kohli, mayte finley . So [facility] [phone].    ATENCIÓN: Si habla español, tiene a anne disposición servicios gratuitos de asistencia lingüística. Valorie al [phone].    We comply with applicable federal civil rights laws and [place] laws. We do not discriminate on the basis of race, color, national origin, age, disability, sex, sexual orientation, or gender identity.            Thank you!     Thank you for choosing [facility]  for your care. Our goal is always to provide you with excellent care. Hearing back from our patients is one way we can continue to improve our services. Please take a few minutes to complete the written survey that you may receive in the mail after your visit with us. Thank you!             Your Updated Medication List - Protect others around you: Learn how to safely use, store and throw away your medicines at www.disposemymeds.org.          This list  is accurate as of 4/23/18  8:16 AM.  Always use your most recent med list.                   Brand Name Dispense Instructions for use Diagnosis    HYDROcodone-acetaminophen 5-325 MG per tablet    NORCO    30 tablet    Take 1-2 tablets by mouth every 6 hours as needed for moderate to severe pain    Post-op pain          No

## 2019-12-18 ENCOUNTER — OFFICE VISIT (OUTPATIENT)
Dept: FAMILY MEDICINE | Facility: CLINIC | Age: 19
End: 2019-12-18
Payer: COMMERCIAL

## 2019-12-18 VITALS
DIASTOLIC BLOOD PRESSURE: 76 MMHG | HEART RATE: 80 BPM | HEIGHT: 70 IN | SYSTOLIC BLOOD PRESSURE: 103 MMHG | BODY MASS INDEX: 26.93 KG/M2 | WEIGHT: 188.13 LBS | OXYGEN SATURATION: 98 % | TEMPERATURE: 98.1 F

## 2019-12-18 DIAGNOSIS — Z23 NEED FOR IMMUNIZATION AGAINST TYPHOID: Primary | ICD-10-CM

## 2019-12-18 DIAGNOSIS — Z23 NEED FOR HEPATITIS A IMMUNIZATION: ICD-10-CM

## 2019-12-18 DIAGNOSIS — Z71.84 ENCOUNTER FOR COUNSELING FOR TRAVEL: ICD-10-CM

## 2019-12-18 PROCEDURE — 90632 HEPA VACCINE ADULT IM: CPT | Performed by: PHYSICIAN ASSISTANT

## 2019-12-18 PROCEDURE — 99401 PREV MED CNSL INDIV APPRX 15: CPT | Mod: 25 | Performed by: PHYSICIAN ASSISTANT

## 2019-12-18 PROCEDURE — 90691 TYPHOID VACCINE IM: CPT | Performed by: PHYSICIAN ASSISTANT

## 2019-12-18 PROCEDURE — 90471 IMMUNIZATION ADMIN: CPT | Performed by: PHYSICIAN ASSISTANT

## 2019-12-18 PROCEDURE — 90472 IMMUNIZATION ADMIN EACH ADD: CPT | Performed by: PHYSICIAN ASSISTANT

## 2019-12-18 RX ORDER — AZITHROMYCIN 500 MG/1
TABLET, FILM COATED ORAL
Qty: 3 TABLET | Refills: 0 | Status: SHIPPED | OUTPATIENT
Start: 2019-12-18

## 2019-12-18 ASSESSMENT — MIFFLIN-ST. JEOR: SCORE: 1856.64

## 2019-12-18 NOTE — PROGRESS NOTES
SUBJECTIVE: Sanjay Burton , a 19 year old  male, presents for counseling and information regarding upcoming travel to [facility]. Special medical concerns include: none. He anticipates the following unusual exposures: none.    Itinerary:  [facility]     Departure Date: 01/05/20 Return date: 01/19/20    Reason for travel (i.e. Business, pleasure): school    Visiting an urban or rural area?: both     Accommodations (i.e. hotel, hostel, friends, family, etc): hostel       IMMUNIZATION HISTORY  Have you received any vaccinations in the past 4 weeks?  No  Have you ever fainted from having your blood drawn or from an injection?  No  Have you ever had a fever reaction to vaccination?  No  Have you ever had any bad reaction or side effect from any vaccination?  No  Have you ever had hepatitis A or B vaccine?  Yes   Do you live (or work closely) with anyone who has AIDS, an AIDS-like condition, any other immune disorder or who is on chemotherapy for cancer?  No  Have you received any injection of immune globulin or any blood products during the past 12 months?  No    GENERAL MEDICAL HISTORY  Do you have a medical condition that warrants maintenance medication or physician follow-up?  No  Do you have a medical condition that is stable now, but that may recur while traveling?  No  Has your spleen been removed?  No  Have you had an acute illness or a fever in the past 48 hours?  No  Are you pregnant, or might you become pregnant on this trip?  Any chance of pregnancy?  No  Are you breastfeeding?  No  Do you have HIV, AIDS, an AIDS-like condition, any other immune disorder, leukemia or cancer?  No  Do you have a severe combined immunodeficiency disease?  No  Have you had your thymus gland removed or history of problems with your thymus, such as myasthenia gravis, DiGeorge syndrome, or thymoma?  No    Do you have severe thrombocytopenia (low platelet count) or a coagulation disorder?  No  Have you ever had a convulsion,  seizure, epilepsy, neurologic condition or brain infection?  No  Do you have any stomach conditions?  No  Do you have a G6PD deficiency?  No  Do you have severe renal or kidney impairment?  No  Do you have a history of psychiatric problems?  No  Do you have a problem with strange dreams and/or nightmares?  No  Do you have insomnia?  No  Do you have problems with vaginitis?  No  Do you have psoriasis?  No  Are you prone to motion sickness?  No  Have you ever had headaches, nausea, vomiting, or breathing problems from altitude exposure?  No      No past medical history on file.   Immunization History   Administered Date(s) Administered     DTAP (<7y) 1998, 01/04/1999, 02/26/1999, 03/31/2000, 09/02/2003, 10/01/2010     HEPA 08/09/2012     HPV 08/09/2012     HepB 1998, 1998, 06/25/1999     Hib (PRP-T) 1998     MMR 03/31/2000, 09/02/2003     Meningococcal (Menactra ) 08/09/2012     Meningococcal (Menveo ) 08/09/2012     Poliovirus, inactivated (IPV) 1998, 01/04/1999, 03/31/2000, 09/02/2003     TDAP Vaccine (Adacel) 08/09/2012     Varicella 01/04/1999, 03/31/2000       No current outpatient medications on file.     Allergies   Allergen Reactions     Percocet [Oxycodone-Acetaminophen] Rash        EXAM: deferred    Immunizations discussed include: Hepatitis A and Typhoid  Malaraia prophylaxis recommended: not needed- Rhett García  Symptomatic treatment for traveler's diarrhea: bismuth subsalicylate, loperamide/diphenoxylate and azithromycin    ASSESSMENT/PLAN:    (Z23) Need for immunization against typhoid  (primary encounter diagnosis)    Comment: Hepatitis A and typhoid vaccines today. Patient will return or follow-up with PCP as needed. Prophylaxis given for Traveler's diarrhea and is not needed for Malaria. All questions were answered.     Plan: TYPHOID VACCINE, IM            (Z71.84) Encounter for counseling for travel  Comment:   Plan: azithromycin (ZITHROMAX) 500 MG tablet            (Z23)  Need for hepatitis A immunization  Comment:   Plan: HEPA VACCINE ADULT IM              I have reviewed general recommendations for safe travel   including: food/water precautions, insect avoidance, safe sex   practices given high prevalence of HIV and other STDs,   roadway safety. Educational materials and links to the CDC   Traveler's health website have been provided.    Total time 20 minutes, greater than 50 percent in counseling   and coordination of care.

## 2019-12-18 NOTE — PATIENT INSTRUCTIONS
"See travel packet provided  Recommend ultrathon (mosquito repellant), pepto bismol and imodium  The food and drink choices you make while traveling can impact your likelihood of getting sick.   If you aren't sure if a food or drink is safe, the saying \" BOIL IT, COOK IT, PEEL IT, OR FORGET IT\" can help you decide whether it's okay to consume.   Also bring hand  and sun screen with you.  Safe Travels       Today December 18, 2019 you received the    Hepatitis A Vaccine - This is your final dose.    Typhoid - injectable. This vaccine is valid for two years.   .    These appointments can be made as a NURSE ONLY visit.    **It is very important for the vaccinations to be given on the scheduled day(s), this helps ensure you receive the full effectiveness of the vaccine.**    Please call [facility] with any questions [phone]    Thank you for visiting [facility]    "
